# Patient Record
Sex: MALE | Race: WHITE | NOT HISPANIC OR LATINO
[De-identification: names, ages, dates, MRNs, and addresses within clinical notes are randomized per-mention and may not be internally consistent; named-entity substitution may affect disease eponyms.]

---

## 2017-02-14 ENCOUNTER — TRANSCRIPTION ENCOUNTER (OUTPATIENT)
Age: 62
End: 2017-02-14

## 2017-08-03 ENCOUNTER — OUTPATIENT (OUTPATIENT)
Dept: OUTPATIENT SERVICES | Facility: HOSPITAL | Age: 62
LOS: 1 days | Discharge: HOME | End: 2017-08-03

## 2017-08-07 DIAGNOSIS — K29.60 OTHER GASTRITIS WITHOUT BLEEDING: ICD-10-CM

## 2017-08-07 DIAGNOSIS — K44.9 DIAPHRAGMATIC HERNIA WITHOUT OBSTRUCTION OR GANGRENE: ICD-10-CM

## 2017-08-07 DIAGNOSIS — K22.70 BARRETT'S ESOPHAGUS WITHOUT DYSPLASIA: ICD-10-CM

## 2017-08-07 DIAGNOSIS — K31.7 POLYP OF STOMACH AND DUODENUM: ICD-10-CM

## 2017-08-07 DIAGNOSIS — I10 ESSENTIAL (PRIMARY) HYPERTENSION: ICD-10-CM

## 2017-08-08 ENCOUNTER — TRANSCRIPTION ENCOUNTER (OUTPATIENT)
Age: 62
End: 2017-08-08

## 2017-08-11 ENCOUNTER — INPATIENT (INPATIENT)
Facility: HOSPITAL | Age: 62
LOS: 0 days | Discharge: HOME | End: 2017-08-12
Attending: INTERNAL MEDICINE

## 2017-08-16 DIAGNOSIS — E04.1 NONTOXIC SINGLE THYROID NODULE: ICD-10-CM

## 2017-08-16 DIAGNOSIS — J36 PERITONSILLAR ABSCESS: ICD-10-CM

## 2017-08-16 DIAGNOSIS — E86.0 DEHYDRATION: ICD-10-CM

## 2017-08-16 DIAGNOSIS — R13.10 DYSPHAGIA, UNSPECIFIED: ICD-10-CM

## 2017-08-16 DIAGNOSIS — K22.70 BARRETT'S ESOPHAGUS WITHOUT DYSPLASIA: ICD-10-CM

## 2017-08-16 DIAGNOSIS — I10 ESSENTIAL (PRIMARY) HYPERTENSION: ICD-10-CM

## 2017-08-16 DIAGNOSIS — K21.9 GASTRO-ESOPHAGEAL REFLUX DISEASE WITHOUT ESOPHAGITIS: ICD-10-CM

## 2017-08-24 ENCOUNTER — TRANSCRIPTION ENCOUNTER (OUTPATIENT)
Age: 62
End: 2017-08-24

## 2017-09-22 ENCOUNTER — OUTPATIENT (OUTPATIENT)
Dept: OUTPATIENT SERVICES | Facility: HOSPITAL | Age: 62
LOS: 1 days | Discharge: HOME | End: 2017-09-22

## 2017-09-22 DIAGNOSIS — R10.13 EPIGASTRIC PAIN: ICD-10-CM

## 2017-12-28 ENCOUNTER — OUTPATIENT (OUTPATIENT)
Dept: OUTPATIENT SERVICES | Facility: HOSPITAL | Age: 62
LOS: 1 days | Discharge: HOME | End: 2017-12-28

## 2017-12-28 DIAGNOSIS — D44.0 NEOPLASM OF UNCERTAIN BEHAVIOR OF THYROID GLAND: ICD-10-CM

## 2018-05-31 ENCOUNTER — APPOINTMENT (OUTPATIENT)
Dept: SURGERY | Facility: CLINIC | Age: 63
End: 2018-05-31
Payer: COMMERCIAL

## 2018-05-31 VITALS
DIASTOLIC BLOOD PRESSURE: 82 MMHG | SYSTOLIC BLOOD PRESSURE: 138 MMHG | WEIGHT: 215 LBS | BODY MASS INDEX: 26.18 KG/M2 | HEIGHT: 76 IN

## 2018-05-31 DIAGNOSIS — Z78.9 OTHER SPECIFIED HEALTH STATUS: ICD-10-CM

## 2018-05-31 DIAGNOSIS — Z82.49 FAMILY HISTORY OF ISCHEMIC HEART DISEASE AND OTHER DISEASES OF THE CIRCULATORY SYSTEM: ICD-10-CM

## 2018-05-31 DIAGNOSIS — H54.7 UNSPECIFIED VISUAL LOSS: ICD-10-CM

## 2018-05-31 DIAGNOSIS — K60.2 ANAL FISSURE, UNSPECIFIED: ICD-10-CM

## 2018-05-31 DIAGNOSIS — K64.9 UNSPECIFIED HEMORRHOIDS: ICD-10-CM

## 2018-05-31 DIAGNOSIS — K30 FUNCTIONAL DYSPEPSIA: ICD-10-CM

## 2018-05-31 DIAGNOSIS — R12 HEARTBURN: ICD-10-CM

## 2018-05-31 DIAGNOSIS — Z80.0 FAMILY HISTORY OF MALIGNANT NEOPLASM OF DIGESTIVE ORGANS: ICD-10-CM

## 2018-05-31 DIAGNOSIS — Z83.49 FAMILY HISTORY OF OTHER ENDOCRINE, NUTRITIONAL AND METABOLIC DISEASES: ICD-10-CM

## 2018-05-31 DIAGNOSIS — Z82.3 FAMILY HISTORY OF STROKE: ICD-10-CM

## 2018-05-31 DIAGNOSIS — K59.00 CONSTIPATION, UNSPECIFIED: ICD-10-CM

## 2018-05-31 PROCEDURE — 46600 DIAGNOSTIC ANOSCOPY SPX: CPT

## 2018-05-31 PROCEDURE — 99203 OFFICE O/P NEW LOW 30 MIN: CPT | Mod: 25

## 2018-06-04 PROBLEM — Z83.49 FAMILY HISTORY OF THYROID DISEASE: Status: ACTIVE | Noted: 2018-05-31

## 2018-06-04 PROBLEM — Z82.3 FAMILY HISTORY OF CEREBROVASCULAR ACCIDENT (CVA): Status: ACTIVE | Noted: 2018-05-31

## 2018-06-04 PROBLEM — Z78.9 CAFFEINE USE: Status: ACTIVE | Noted: 2018-05-31

## 2018-06-04 PROBLEM — Z80.0 FAMILY HISTORY OF LIVER CANCER: Status: ACTIVE | Noted: 2018-05-31

## 2018-06-04 PROBLEM — Z82.49 FAMILY HISTORY OF CARDIAC DISORDER: Status: ACTIVE | Noted: 2018-05-31

## 2018-06-04 PROBLEM — Z78.9 DOES NOT USE TOBACCO: Status: ACTIVE | Noted: 2018-05-31

## 2018-06-04 PROBLEM — Z78.9 SOCIAL ALCOHOL USE: Status: ACTIVE | Noted: 2018-05-31

## 2018-07-03 ENCOUNTER — APPOINTMENT (OUTPATIENT)
Dept: SURGERY | Facility: CLINIC | Age: 63
End: 2018-07-03

## 2019-03-20 ENCOUNTER — OUTPATIENT (OUTPATIENT)
Dept: OUTPATIENT SERVICES | Facility: HOSPITAL | Age: 64
LOS: 1 days | Discharge: HOME | End: 2019-03-20

## 2019-03-20 DIAGNOSIS — A09 INFECTIOUS GASTROENTERITIS AND COLITIS, UNSPECIFIED: ICD-10-CM

## 2019-03-20 DIAGNOSIS — R94.5 ABNORMAL RESULTS OF LIVER FUNCTION STUDIES: ICD-10-CM

## 2019-03-29 ENCOUNTER — OUTPATIENT (OUTPATIENT)
Dept: OUTPATIENT SERVICES | Facility: HOSPITAL | Age: 64
LOS: 1 days | Discharge: HOME | End: 2019-03-29

## 2019-03-29 DIAGNOSIS — N39.0 URINARY TRACT INFECTION, SITE NOT SPECIFIED: ICD-10-CM

## 2019-07-15 ENCOUNTER — EMERGENCY (EMERGENCY)
Facility: HOSPITAL | Age: 64
LOS: 0 days | Discharge: HOME | End: 2019-07-15
Attending: EMERGENCY MEDICINE | Admitting: EMERGENCY MEDICINE
Payer: COMMERCIAL

## 2019-07-15 VITALS
HEART RATE: 87 BPM | DIASTOLIC BLOOD PRESSURE: 70 MMHG | OXYGEN SATURATION: 99 % | RESPIRATION RATE: 18 BRPM | SYSTOLIC BLOOD PRESSURE: 164 MMHG | TEMPERATURE: 98 F

## 2019-07-15 VITALS
RESPIRATION RATE: 17 BRPM | DIASTOLIC BLOOD PRESSURE: 71 MMHG | TEMPERATURE: 98 F | HEART RATE: 69 BPM | SYSTOLIC BLOOD PRESSURE: 119 MMHG | OXYGEN SATURATION: 98 %

## 2019-07-15 DIAGNOSIS — N17.9 ACUTE KIDNEY FAILURE, UNSPECIFIED: ICD-10-CM

## 2019-07-15 DIAGNOSIS — R00.1 BRADYCARDIA, UNSPECIFIED: ICD-10-CM

## 2019-07-15 DIAGNOSIS — R42 DIZZINESS AND GIDDINESS: ICD-10-CM

## 2019-07-15 DIAGNOSIS — R07.89 OTHER CHEST PAIN: ICD-10-CM

## 2019-07-15 DIAGNOSIS — F41.9 ANXIETY DISORDER, UNSPECIFIED: ICD-10-CM

## 2019-07-15 LAB
ALBUMIN SERPL ELPH-MCNC: 4.3 G/DL — SIGNIFICANT CHANGE UP (ref 3.5–5.2)
ALP SERPL-CCNC: 124 U/L — HIGH (ref 30–115)
ALT FLD-CCNC: 47 U/L — HIGH (ref 0–41)
ANION GAP SERPL CALC-SCNC: 15 MMOL/L — HIGH (ref 7–14)
APPEARANCE UR: CLEAR — SIGNIFICANT CHANGE UP
AST SERPL-CCNC: 26 U/L — SIGNIFICANT CHANGE UP (ref 0–41)
BASE EXCESS BLDV CALC-SCNC: -1.4 MMOL/L — SIGNIFICANT CHANGE UP (ref -2–2)
BILIRUB SERPL-MCNC: 0.4 MG/DL — SIGNIFICANT CHANGE UP (ref 0.2–1.2)
BILIRUB UR-MCNC: NEGATIVE — SIGNIFICANT CHANGE UP
BUN SERPL-MCNC: 43 MG/DL — HIGH (ref 10–20)
CA-I SERPL-SCNC: 1.25 MMOL/L — SIGNIFICANT CHANGE UP (ref 1.12–1.3)
CALCIUM SERPL-MCNC: 9.8 MG/DL — SIGNIFICANT CHANGE UP (ref 8.5–10.1)
CHLORIDE SERPL-SCNC: 103 MMOL/L — SIGNIFICANT CHANGE UP (ref 98–110)
CO2 SERPL-SCNC: 19 MMOL/L — SIGNIFICANT CHANGE UP (ref 17–32)
COLOR SPEC: YELLOW — SIGNIFICANT CHANGE UP
CREAT SERPL-MCNC: 2 MG/DL — HIGH (ref 0.7–1.5)
DIFF PNL FLD: NEGATIVE — SIGNIFICANT CHANGE UP
GAS PNL BLDV: 137 MMOL/L — SIGNIFICANT CHANGE UP (ref 136–145)
GAS PNL BLDV: SIGNIFICANT CHANGE UP
GLUCOSE SERPL-MCNC: 100 MG/DL — HIGH (ref 70–99)
GLUCOSE UR QL: NEGATIVE — SIGNIFICANT CHANGE UP
HCO3 BLDV-SCNC: 23 MMOL/L — SIGNIFICANT CHANGE UP (ref 22–29)
HCT VFR BLD CALC: 35.2 % — LOW (ref 42–52)
HCT VFR BLDA CALC: 43.6 % — SIGNIFICANT CHANGE UP (ref 34–44)
HGB BLD CALC-MCNC: 14.2 G/DL — SIGNIFICANT CHANGE UP (ref 14–18)
HGB BLD-MCNC: 11.7 G/DL — LOW (ref 14–18)
KETONES UR-MCNC: NEGATIVE — SIGNIFICANT CHANGE UP
LACTATE BLDV-MCNC: 2.1 MMOL/L — HIGH (ref 0.5–1.6)
LEUKOCYTE ESTERASE UR-ACNC: NEGATIVE — SIGNIFICANT CHANGE UP
MAGNESIUM SERPL-MCNC: 2.1 MG/DL — SIGNIFICANT CHANGE UP (ref 1.8–2.4)
MCHC RBC-ENTMCNC: 27.3 PG — SIGNIFICANT CHANGE UP (ref 27–31)
MCHC RBC-ENTMCNC: 33.2 G/DL — SIGNIFICANT CHANGE UP (ref 32–37)
MCV RBC AUTO: 82.2 FL — SIGNIFICANT CHANGE UP (ref 80–94)
NITRITE UR-MCNC: NEGATIVE — SIGNIFICANT CHANGE UP
NRBC # BLD: 0 /100 WBCS — SIGNIFICANT CHANGE UP (ref 0–0)
NT-PROBNP SERPL-SCNC: 721 PG/ML — HIGH (ref 0–300)
PCO2 BLDV: 36 MMHG — LOW (ref 41–51)
PH BLDV: 7.41 — SIGNIFICANT CHANGE UP (ref 7.26–7.43)
PH UR: 6.5 — SIGNIFICANT CHANGE UP (ref 5–8)
PLATELET # BLD AUTO: 229 K/UL — SIGNIFICANT CHANGE UP (ref 130–400)
PO2 BLDV: 27 MMHG — SIGNIFICANT CHANGE UP (ref 20–40)
POTASSIUM BLDV-SCNC: 4.5 MMOL/L — SIGNIFICANT CHANGE UP (ref 3.3–5.6)
POTASSIUM SERPL-MCNC: 5 MMOL/L — SIGNIFICANT CHANGE UP (ref 3.5–5)
POTASSIUM SERPL-SCNC: 5 MMOL/L — SIGNIFICANT CHANGE UP (ref 3.5–5)
PROT SERPL-MCNC: 7.3 G/DL — SIGNIFICANT CHANGE UP (ref 6–8)
PROT UR-MCNC: ABNORMAL
RBC # BLD: 4.28 M/UL — LOW (ref 4.7–6.1)
RBC # FLD: 14.5 % — SIGNIFICANT CHANGE UP (ref 11.5–14.5)
SAO2 % BLDV: 50 % — SIGNIFICANT CHANGE UP
SODIUM SERPL-SCNC: 137 MMOL/L — SIGNIFICANT CHANGE UP (ref 135–146)
SP GR SPEC: 1.01 — SIGNIFICANT CHANGE UP (ref 1.01–1.03)
TROPONIN T SERPL-MCNC: <0.01 NG/ML — SIGNIFICANT CHANGE UP
UROBILINOGEN FLD QL: 0.2 — SIGNIFICANT CHANGE UP (ref 0.2–0.2)
WBC # BLD: 6.44 K/UL — SIGNIFICANT CHANGE UP (ref 4.8–10.8)
WBC # FLD AUTO: 6.44 K/UL — SIGNIFICANT CHANGE UP (ref 4.8–10.8)
WBC UR QL: 1 /HPF — SIGNIFICANT CHANGE UP (ref 0–5)

## 2019-07-15 PROCEDURE — 93289 INTERROG DEVICE EVAL HEART: CPT | Mod: 26

## 2019-07-15 PROCEDURE — 93010 ELECTROCARDIOGRAM REPORT: CPT | Mod: 76

## 2019-07-15 PROCEDURE — 71046 X-RAY EXAM CHEST 2 VIEWS: CPT | Mod: 26

## 2019-07-15 PROCEDURE — 99285 EMERGENCY DEPT VISIT HI MDM: CPT

## 2019-07-15 RX ORDER — ALPRAZOLAM 0.25 MG
0.25 TABLET ORAL ONCE
Refills: 0 | Status: DISCONTINUED | OUTPATIENT
Start: 2019-07-15 | End: 2019-07-15

## 2019-07-15 RX ORDER — SODIUM CHLORIDE 9 MG/ML
500 INJECTION INTRAMUSCULAR; INTRAVENOUS; SUBCUTANEOUS ONCE
Refills: 0 | Status: COMPLETED | OUTPATIENT
Start: 2019-07-15 | End: 2019-07-15

## 2019-07-15 RX ORDER — SODIUM CHLORIDE 9 MG/ML
1000 INJECTION INTRAMUSCULAR; INTRAVENOUS; SUBCUTANEOUS ONCE
Refills: 0 | Status: COMPLETED | OUTPATIENT
Start: 2019-07-15 | End: 2019-07-15

## 2019-07-15 RX ADMIN — SODIUM CHLORIDE 500 MILLILITER(S): 9 INJECTION INTRAMUSCULAR; INTRAVENOUS; SUBCUTANEOUS at 10:50

## 2019-07-15 RX ADMIN — SODIUM CHLORIDE 500 MILLILITER(S): 9 INJECTION INTRAMUSCULAR; INTRAVENOUS; SUBCUTANEOUS at 11:40

## 2019-07-15 RX ADMIN — Medication 0.25 MILLIGRAM(S): at 10:49

## 2019-07-15 NOTE — ED PROVIDER NOTE - ATTENDING CONTRIBUTION TO CARE
ATTENDING NOTE: I personally evaluated the patient. I reviewed the Resident’s (as assigned above), and agree with the findings and plan except as documented in my note.   64 y/o M recent MI s/p PCI s/p stent with pacemaker placed, on Amiodarone, Metoprolol presents with c/o “head pressure” and near syncope. Pt states feeling SX while driving this morning, with SX resolving on arrival to ED. Otherwise has been in his usual state of sameer. Denies fever/chills, vision changes, CP, palpitations. No HX of syncope.  On exam: Pt is well appearing in NAD. Head NCAT. PERRL, EOMI. MMM. Lungs CTAB, S1S2 regular. Abdomen soft, NT/ND. AAO x 3, normal speech, no facial asymmetry, negative pronator drift, no ataxia, negative Romberg, no dysdiadokinesia, no nystagmus, peripheral vision intact, sensory equal and intact   Plan for pacemaker interrogation, bedside echo, EKG, cardiac enzymes, IVF and reassess. Disposition unclear at this time.

## 2019-07-15 NOTE — ED PROVIDER NOTE - PROGRESS NOTE DETAILS
EP aware, will interrogate Pt became fabián, awake alert, repeat EKG was done. ATTENDING NOTE: I personally evaluated the patient. I reviewed the Resident’s (as assigned above), and agree with the findings and plan except as documented in my note.   62 y/o M recent MI s/p PCI s/p stent with pacemaker placed, on Amiodarone, Metoprolol presents with c/o “head pressure” and near syncope. Pt states feeling SX while driving this morning, with SX resolving on arrival to ED. Otherwise has been in his usual state of sameer. Denies fever/chills, vision changes, CP, palpitations. No HX of syncope.  On exam: Pt is well appearing in NAD. Head NCAT. PERRL, EOMI. MMM. Lungs CTAB, S1S2 regular. Abdomen soft, NT/ND. AAO x 3, normal speech, no facial asymmetry, negative pronator drift, no ataxia, negative Romberg, no dysdiadokinesia, no nystagmus, peripheral vision intact, sensory equal and intact   Plan for pacemaker interrogation, bedside echo, EKG, cardiac enzymes, IVF and reassess. Disposition unclear at this time. EP interrogated device. NO events Per CHF team, ok to discharge home, no aldactone and half the dose of lisinopril to 2.5. Follow up outpatient Discussed with pt and family labs and imaging. Discussed need to follow up with CHF team. Discussed signs and symptoms to return to the ED for. Pt understands and agrees with discharge plan.

## 2019-07-15 NOTE — ED PROVIDER NOTE - CLINICAL SUMMARY MEDICAL DECISION MAKING FREE TEXT BOX
Pacemaker interrogated without abnormality. Lab work reassuring. Discussed with patients heart failure team. Medications adjusted for hyperkalemia. Pt voiced understanding and agrees with plan for discharge. Family aware. All questions answered. Copies of results provided.

## 2019-07-15 NOTE — ED ADULT NURSE NOTE - OBJECTIVE STATEMENT
Patient present to ED with c/o dizziness and SOB. Patient notes he had palpations and SOB when he first arrived but at present time he doesn't feel like that. Nose bleed noted earlier this morning. Denies any pain or discomfort at present time. Patient notes dizziness and light headed this morning to the point he thought he was going to passout

## 2019-07-15 NOTE — ED ADULT NURSE NOTE - NSIMPLEMENTINTERV_GEN_ALL_ED
Implemented All Fall with Harm Risk Interventions:  [place] to call system. Call bell, personal items and telephone within reach. Instruct patient to call for assistance. Room bathroom lighting operational. Non-slip footwear when patient is off stretcher. Physically safe environment: no spills, clutter or unnecessary equipment. Stretcher in lowest position, wheels locked, appropriate side rails in place. Provide visual cue, wrist band, yellow gown, etc. Monitor gait and stability. Monitor for mental status changes and reorient to person, place, and time. Review medications for side effects contributing to fall risk. Reinforce activity limits and safety measures with patient and family. Provide visual clues: red socks.

## 2019-07-15 NOTE — CONSULT NOTE ADULT - ASSESSMENT
62 YO M with pmh of CAD s/p STEMI s/p PCI to LAD in Feb 2019, complicated by cardiogenic shock, HFrEF s/p AICD, HTN, and GERD p/w symptoms of dizziness, vertigo, and feeling of fullness and warmth in the head being seen by Cardiology for pre-syncopal symptoms.    A & P:    HTN  CAD s/p PCI to LAD  HFrEF  Dizziness/vertigo    - symptoms likely secondary to orthostatic hypotension, positive orthostatics in the ER  - IV Fluids to replete volume  - c/w GDMT for CAD and HFrEF DAPT, statin, betablocker, ACEi, spironolactone  - no events on tele monitor  - interrogate the AICD  - can f/u with Cardiology and EP as outpatient

## 2019-07-15 NOTE — ED ADULT NURSE NOTE - ED STAT RN HANDOFF DETAILS
Patient discharged home as per MD order, IV access removed no redness, swelling or bleeding noted at site. All discharge instructions and F/U visits provided to patient patient verbalizes understanding leaving ambulatory in no acute distress.

## 2019-07-15 NOTE — ED PROVIDER NOTE - NSFOLLOWUPINSTRUCTIONS_ED_ALL_ED_FT
Acute Kidney Injury    WHAT YOU NEED TO KNOW:    What is acute kidney injury? Acute kidney injury (JONNY) is also called acute kidney failure, or acute renal failure. JONNY happens when your kidneys suddenly stop working correctly. Normally, the kidneys remove fluid, chemicals, and waste from your blood. These wastes are turned into urine by your kidneys. JONNY usually happens over hours or days. When you have JONNY, your kidneys do not remove the waste, chemicals, or extra fluid from your body. A normal amount of urine is not produced. JONNY is usually temporary, but it may become a chronic kidney condition.     What causes JONNY?     Decreased blood flow to the kidney, such as from hypercalcemia (high blood calcium level) or severe heart disease       A disease or condition that affects the kidneys, such as hypertension (high blood pressure) or diabetes       A blockage in the kidney or ureter, such as a kidney or bladder stone, enlarged prostate, or tumor     What increases my risk for JONNY?     Being hospitalized with a serious illness, such as sepsis or severe burns      Peripheral artery disease      Older age in adults      Kidney or liver diseases      Medical conditions such as dehydration, hypertension, diabetes, or heart failure      Certain medicines such as NSAIDs    What are the signs and symptoms of JONNY? You may not have any symptoms with early or mild JONNY. As JONNY progresses, you may have any of the following:     Decrease in the amount of urine or no urination      Swelling in your arms, legs, or feet       Weakness, drowsiness, or no appetite      Nausea, flank pain, muscle twitching or muscle cramps      Itchy skin, or your breath or body smells like urine      Behavior changes, confusion, disorientation, or seizures    How is JONNY diagnosed? There are many causes of JONNY. To find the cause and to treat your JONNY correctly, your healthcare provider may do any of the following:     Blood and urine tests show how well your kidneys are working. They may also show the cause of your JONNY.       An x-ray or ultrasound may show problems with your kidneys. Your healthcare provider may see a blockage in your kidneys. He or she may see narrowing of the artery that sends blood to your kidneys. You may be given contrast liquid to help your kidneys show up better in the pictures. Tell the healthcare provider if you have ever had an allergic reaction to contrast liquid.     How is JONNY treated? Treatment depends upon the cause of your acute kidney injury and how severe it is. Usually, JONNY will be monitored in the hospital. If you have mild JONNY, you may be able to go home to recover. Your healthcare providers will treat the cause of your JONNY. You may need IV fluids if your JONNY was caused by little or no fluid in your body. You may need dialysis to remove waste and extra fluid from your body. Your healthcare provider may tell you to eat food low in sodium (salt), potassium, phosphorus, or protein. You may need to see a dietitian before you are discharged to get help with planning your meals.     How can I prevent JONNY?     Manage other health conditions such as diabetes, high blood pressure, or heart disease. These conditions increase your risk for acute kidney injury. Take your medicines for these conditions as directed. Also, monitor your blood sugar and blood pressure levels as directed. Contact your healthcare provider if your levels are not in the range he or she says it should be.      Talk to your healthcare provider before you take over-the-counter-medicine. NSAIDs, stomach medicine, or laxatives may harm your kidneys and increase your risk for acute kidney injury. If it is okay to take the medicine, follow the directions on the package. Do not take more than directed.       Tell healthcare providers if you have had JONNY before you get contrast liquid for an x-ray or CT scan. Your healthcare provider may give you medicine to prevent kidney problems caused by the liquid.     CARE AGREEMENT:    You have the right to help plan your care. Learn about your health condition and how it may be treated. Discuss treatment options with your healthcare providers to decide what care you want to receive. You always have the right to refuse treatment.     Dizziness    Dizziness can manifest as a feeling of unsteadiness or light-headedness. You may feel like you are about to faint. This condition can be caused by a number of things, including medicines, dehydration, or illness. Drink enough fluid to keep your urine clear or pale yellow. Do not drink alcohol and limit your caffeine intake. Avoid quick or sudden movements.  Rise slowly from chairs and steady yourself until you feel okay. In the morning, first sit up on the side of the bed.    SEEK IMMEDIATE MEDICAL CARE IF YOU HAVE ANY OF THE FOLLOWING SYMPTOMS: vomiting, changes in your vision or speech, weakness in your arms or legs, trouble speaking or swallowing, chest pain, abdominal pain, shortness of breath, sweating, bleeding, headache, neck pain, or fever. PLEASE STOP TAKING ALDACTONE AND PLEASE TAKE HALF THE DOSE OF LISINOPRIL WHICH WILL BE 2.5     PLEASE FOLLOW UP WITH THE CARDIOLOGIST     Acute Kidney Injury    WHAT YOU NEED TO KNOW:    What is acute kidney injury? Acute kidney injury (JONNY) is also called acute kidney failure, or acute renal failure. JONNY happens when your kidneys suddenly stop working correctly. Normally, the kidneys remove fluid, chemicals, and waste from your blood. These wastes are turned into urine by your kidneys. JONNY usually happens over hours or days. When you have JONNY, your kidneys do not remove the waste, chemicals, or extra fluid from your body. A normal amount of urine is not produced. JONNY is usually temporary, but it may become a chronic kidney condition.     What causes JONNY?     Decreased blood flow to the kidney, such as from hypercalcemia (high blood calcium level) or severe heart disease       A disease or condition that affects the kidneys, such as hypertension (high blood pressure) or diabetes       A blockage in the kidney or ureter, such as a kidney or bladder stone, enlarged prostate, or tumor     What increases my risk for JONNY?     Being hospitalized with a serious illness, such as sepsis or severe burns      Peripheral artery disease      Older age in adults      Kidney or liver diseases      Medical conditions such as dehydration, hypertension, diabetes, or heart failure      Certain medicines such as NSAIDs    What are the signs and symptoms of JONNY? You may not have any symptoms with early or mild JONNY. As JONNY progresses, you may have any of the following:     Decrease in the amount of urine or no urination      Swelling in your arms, legs, or feet       Weakness, drowsiness, or no appetite      Nausea, flank pain, muscle twitching or muscle cramps      Itchy skin, or your breath or body smells like urine      Behavior changes, confusion, disorientation, or seizures    How is JONNY diagnosed? There are many causes of JONNY. To find the cause and to treat your JONNY correctly, your healthcare provider may do any of the following:     Blood and urine tests show how well your kidneys are working. They may also show the cause of your JONNY.       An x-ray or ultrasound may show problems with your kidneys. Your healthcare provider may see a blockage in your kidneys. He or she may see narrowing of the artery that sends blood to your kidneys. You may be given contrast liquid to help your kidneys show up better in the pictures. Tell the healthcare provider if you have ever had an allergic reaction to contrast liquid.     How is JONNY treated? Treatment depends upon the cause of your acute kidney injury and how severe it is. Usually, JONNY will be monitored in the hospital. If you have mild JONNY, you may be able to go home to recover. Your healthcare providers will treat the cause of your JONNY. You may need IV fluids if your JONNY was caused by little or no fluid in your body. You may need dialysis to remove waste and extra fluid from your body. Your healthcare provider may tell you to eat food low in sodium (salt), potassium, phosphorus, or protein. You may need to see a dietitian before you are discharged to get help with planning your meals.     How can I prevent JONNY?     Manage other health conditions such as diabetes, high blood pressure, or heart disease. These conditions increase your risk for acute kidney injury. Take your medicines for these conditions as directed. Also, monitor your blood sugar and blood pressure levels as directed. Contact your healthcare provider if your levels are not in the range he or she says it should be.      Talk to your healthcare provider before you take over-the-counter-medicine. NSAIDs, stomach medicine, or laxatives may harm your kidneys and increase your risk for acute kidney injury. If it is okay to take the medicine, follow the directions on the package. Do not take more than directed.       Tell healthcare providers if you have had JONNY before you get contrast liquid for an x-ray or CT scan. Your healthcare provider may give you medicine to prevent kidney problems caused by the liquid.     CARE AGREEMENT:    You have the right to help plan your care. Learn about your health condition and how it may be treated. Discuss treatment options with your healthcare providers to decide what care you want to receive. You always have the right to refuse treatment.     Dizziness    Dizziness can manifest as a feeling of unsteadiness or light-headedness. You may feel like you are about to faint. This condition can be caused by a number of things, including medicines, dehydration, or illness. Drink enough fluid to keep your urine clear or pale yellow. Do not drink alcohol and limit your caffeine intake. Avoid quick or sudden movements.  Rise slowly from chairs and steady yourself until you feel okay. In the morning, first sit up on the side of the bed.    SEEK IMMEDIATE MEDICAL CARE IF YOU HAVE ANY OF THE FOLLOWING SYMPTOMS: vomiting, changes in your vision or speech, weakness in your arms or legs, trouble speaking or swallowing, chest pain, abdominal pain, shortness of breath, sweating, bleeding, headache, neck pain, or fever.

## 2019-07-15 NOTE — ED PROVIDER NOTE - CARE PLAN
Principal Discharge DX:	JONNY (acute kidney injury)  Secondary Diagnosis:	Dizziness  Secondary Diagnosis:	Anxiety

## 2019-07-15 NOTE — ED PROVIDER NOTE - OBJECTIVE STATEMENT
62 yo M with hx of MI complete LAD occlusion in Feb 2019 on IMPALA trach at that time, s/p 2 stents on brillinta, HTN, AICD  [employer], presents for evaluation of "feeling dizzy", which he described as lightheaded, onset PTA, associated with SOB. NO fever, no chest pain, no back pain, no abdominal pain, no palpitations, no headache, no n/v/d. Pt states that he woke up ok this morning, was on his way to the [facility] to see his EP when he started to have the symptoms and decided to come to the ED. Pt denies any other symptoms.

## 2019-07-15 NOTE — CONSULT NOTE ADULT - SUBJECTIVE AND OBJECTIVE BOX
HPI:  60 YO M with pmh of CAD s/p STEMI s/p PCI to LAD in Feb 2019, complicated by cardiogenic shock, HFrEF s/p AICD, HTN, and GERD p/w symptoms of dizziness, vertigo, and feeling of fullness and warmth in the head. Pt. reports that he was driving the car when he noticed these symptoms and he pulled over. Pt. denies any chest pain/pressure, sob, or palpitations, orthopnea, pnd.  Reports compliance with medications.    PAST MEDICAL & SURGICAL HISTORY  CAD s/p STEMI s/p PCI to LAD 2/2019  HTN  GERD  HFrEF    FAMILY HISTORY:  FAMILY HISTORY:  Father MI in 30's  Borther MI in 40's    SOCIAL HISTORY:  []smoker  []Alcohol  []Drug    ALLERGIES:  No Known Allergies      MEDICATIONS:  MEDICATIONS  (STANDING):    MEDICATIONS  (PRN):      HOME MEDICATIONS:  Home Medications:  ASA 81mg Q24  Brilinta 90mg Q12  Lipitor   Toprol XL 50mg  Spironolactone  Lisinopril 5mg  Finasteride  Amiodarone 200mg Q24    VITALS:   T(F): 98 (07-15 @ 14:12), Max: 98 (07-15 @ 09:02)  HR: 69 (07-15 @ 14:12) (69 - 87)  BP: 119/71 (07-15 @ 14:12) (119/71 - 164/70)  BP(mean): --  RR: 17 (07-15 @ 14:12) (17 - 18)  SpO2: 98% (07-15 @ 14:12) (98% - 99%)    I&O's Summary      REVIEW OF SYSTEMS:  CONSTITUTIONAL: No weakness, fevers or chills  EYES/ENT: No visual changes;  No vertigo or throat pain   NECK: No pain or stiffness  RESPIRATORY: No cough, wheezing, hemoptysis; No shortness of breath  CARDIOVASCULAR: No chest pain or palpitations  GASTROINTESTINAL: No abdominal or epigastric pain. No nausea, vomiting, or hematemesis; No diarrhea or constipation. No melena or hematochezia.  GENITOURINARY: No dysuria, frequency or hematuria  NEUROLOGICAL: No numbness or weakness, c/o dizziness  SKIN: No itching, no rashes    PHYSICAL EXAM:  NEURO: patient is awake , alert and oriented  GEN: Not in acute distress  NECK: no thyroid enlargement, no JVD  LUNGS: Clear to auscultation bilaterally   CARDIOVASCULAR: S1/S2 present, RRR , no murmurs or rubs, no carotid bruits,  + PP bilaterally  ABD: Soft, non-tender, non-distended, +BS  EXT: No ABDULLAHI  SKIN: Intact    LABS:                        11.7   6.44  )-----------( 229      ( 15 Jul 2019 09:27 )             35.2     07-15    137  |  103  |  43<H>  ----------------------------<  100<H>  5.0   |  19  |  2.0<H>    Ca    9.8      15 Jul 2019 09:27  Mg     2.1     07-15    TPro  7.3  /  Alb  4.3  /  TBili  0.4  /  DBili  x   /  AST  26  /  ALT  47<H>  /  AlkPhos  124<H>  07-15      Troponin T, Serum: <0.01 ng/mL (07-15-19 @ 09:27)    CARDIAC MARKERS ( 15 Jul 2019 09:27 )  x     / <0.01 ng/mL / x     / x     / x            Troponin trend:    Serum Pro-Brain Natriuretic Peptide: 721 pg/mL (07-15-19 @ 09:27)          RADIOLOGY:  -CXR: 7/15  No radiographic evidence of acute cardiopulmonary disease    -CATHETERIZATION: pt. reported cardiac cath in Feb 2019 with 2 stents in the LAD    ECG:  Sinus bradycardia @ 59 bpm, LAFB, anterolateral Q waves/inferior Q waves (c/w prior infarct)  TELEMETRY EVENTS:  No events

## 2019-07-16 LAB
CULTURE RESULTS: NO GROWTH — SIGNIFICANT CHANGE UP
SPECIMEN SOURCE: SIGNIFICANT CHANGE UP

## 2019-07-20 LAB
CULTURE RESULTS: SIGNIFICANT CHANGE UP
CULTURE RESULTS: SIGNIFICANT CHANGE UP
SPECIMEN SOURCE: SIGNIFICANT CHANGE UP
SPECIMEN SOURCE: SIGNIFICANT CHANGE UP

## 2019-12-28 ENCOUNTER — EMERGENCY (EMERGENCY)
Facility: HOSPITAL | Age: 64
LOS: 0 days | Discharge: HOME | End: 2019-12-28
Attending: EMERGENCY MEDICINE | Admitting: EMERGENCY MEDICINE
Payer: COMMERCIAL

## 2019-12-28 VITALS
TEMPERATURE: 96 F | DIASTOLIC BLOOD PRESSURE: 91 MMHG | RESPIRATION RATE: 18 BRPM | HEART RATE: 69 BPM | OXYGEN SATURATION: 98 % | SYSTOLIC BLOOD PRESSURE: 167 MMHG | WEIGHT: 199.96 LBS
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
## 2019-12-28 DIAGNOSIS — Z23 ENCOUNTER FOR IMMUNIZATION: ICD-10-CM

## 2019-12-28 DIAGNOSIS — M25.512 PAIN IN LEFT SHOULDER: ICD-10-CM

## 2019-12-28 DIAGNOSIS — S42.032A DISPLACED FRACTURE OF LATERAL END OF LEFT CLAVICLE, INITIAL ENCOUNTER FOR CLOSED FRACTURE: ICD-10-CM

## 2019-12-28 PROCEDURE — 73000 X-RAY EXAM OF COLLAR BONE: CPT | Mod: 26,LT

## 2019-12-28 PROCEDURE — 99284 EMERGENCY DEPT VISIT MOD MDM: CPT

## 2019-12-28 PROCEDURE — 73030 X-RAY EXAM OF SHOULDER: CPT | Mod: 26,LT

## 2019-12-28 RX ORDER — TETANUS TOXOID, REDUCED DIPHTHERIA TOXOID AND ACELLULAR PERTUSSIS VACCINE, ADSORBED 5; 2.5; 8; 8; 2.5 [IU]/.5ML; [IU]/.5ML; UG/.5ML; UG/.5ML; UG/.5ML
0.5 SUSPENSION INTRAMUSCULAR ONCE
Refills: 0 | Status: COMPLETED | OUTPATIENT
Start: 2019-12-28 | End: 2019-12-28

## 2019-12-28 RX ADMIN — TETANUS TOXOID, REDUCED DIPHTHERIA TOXOID AND ACELLULAR PERTUSSIS VACCINE, ADSORBED 0.5 MILLILITER(S): 5; 2.5; 8; 8; 2.5 SUSPENSION INTRAMUSCULAR at 21:29

## 2019-12-28 NOTE — ED ADULT NURSE NOTE - INTERVENTIONS DEFINITIONS
[place] to call system/Physically safe environment: no spills, clutter or unnecessary equipment/Stretcher in lowest position, wheels locked, appropriate side rails in place/Monitor gait and stability/Call bell, personal items and telephone within reach/Room bathroom lighting operational

## 2019-12-28 NOTE — ED PROVIDER NOTE - OBJECTIVE STATEMENT
Pt here for L shoulder pain after a fall 2h ago onto the shoulder.  On Brilinta.  No CHI/LOC.  Also sustained small lacerations to L hand.

## 2019-12-28 NOTE — ED PROVIDER NOTE - CARE PLAN
Principal Discharge DX:	Closed displaced fracture of acromial end of left clavicle, initial encounter

## 2019-12-28 NOTE — ED PROVIDER NOTE - CARE PROVIDER_API CALL
Miky Castillo)  Orthopaedic Surgery  [address] [place] [address]  Phone: [phone]  Fax: [phone]  Follow Up Time: 4-6 Days

## 2019-12-28 NOTE — ED PROVIDER NOTE - PHYSICAL EXAMINATION
Vital Signs: I have reviewed the initial vital signs.  Constitutional: well-nourished, appears stated age, no acute distress  Cardiovascular: regular rate, regular rhythm, well-perfused extremities, 2+ radial pulses  Respiratory: unlabored respiratory effort, clear to auscultation bilaterally  MSK: L shoulder soreness, good ROM  Psychiatric: appropriate mood, appropriate affect

## 2019-12-28 NOTE — ED PROVIDER NOTE - PATIENT PORTAL LINK FT
You can access the FollowMyHealth Patient Portal offered by [facility] by registering at the following website: [email or web address]. By joining [facility]’s FollowMyHealth portal, you will also be able to view your health information using other applications (apps) compatible with our system.

## 2019-12-28 NOTE — ED ADULT NURSE NOTE - OBJECTIVE STATEMENT
Pt c/o of L shoulder pain after a fall 2h ago onto the shoulder. pt On Brilinta. pt denies head trauma, LOC, cp, sob, abdominal pain, dizziness, headache.

## 2020-03-05 PROBLEM — I21.9 ACUTE MYOCARDIAL INFARCTION, UNSPECIFIED: Chronic | Status: ACTIVE | Noted: 2019-12-28

## 2020-06-16 ENCOUNTER — EMERGENCY (EMERGENCY)
Facility: HOSPITAL | Age: 65
LOS: 0 days | Discharge: HOME | End: 2020-06-16
Attending: EMERGENCY MEDICINE | Admitting: EMERGENCY MEDICINE
Payer: COMMERCIAL

## 2020-06-16 VITALS
WEIGHT: 210.1 LBS | RESPIRATION RATE: 18 BRPM | OXYGEN SATURATION: 100 % | DIASTOLIC BLOOD PRESSURE: 70 MMHG | SYSTOLIC BLOOD PRESSURE: 138 MMHG | HEART RATE: 62 BPM | TEMPERATURE: 98 F

## 2020-06-16 DIAGNOSIS — Z98.890 OTHER SPECIFIED POSTPROCEDURAL STATES: Chronic | ICD-10-CM

## 2020-06-16 DIAGNOSIS — M54.9 DORSALGIA, UNSPECIFIED: ICD-10-CM

## 2020-06-16 DIAGNOSIS — M54.5 LOW BACK PAIN: ICD-10-CM

## 2020-06-16 DIAGNOSIS — Z95.5 PRESENCE OF CORONARY ANGIOPLASTY IMPLANT AND GRAFT: Chronic | ICD-10-CM

## 2020-06-16 DIAGNOSIS — Z79.899 OTHER LONG TERM (CURRENT) DRUG THERAPY: ICD-10-CM

## 2020-06-16 DIAGNOSIS — Z95.810 PRESENCE OF AUTOMATIC (IMPLANTABLE) CARDIAC DEFIBRILLATOR: ICD-10-CM

## 2020-06-16 DIAGNOSIS — Z95.5 PRESENCE OF CORONARY ANGIOPLASTY IMPLANT AND GRAFT: ICD-10-CM

## 2020-06-16 DIAGNOSIS — Z95.810 PRESENCE OF AUTOMATIC (IMPLANTABLE) CARDIAC DEFIBRILLATOR: Chronic | ICD-10-CM

## 2020-06-16 PROCEDURE — 99284 EMERGENCY DEPT VISIT MOD MDM: CPT

## 2020-06-16 RX ORDER — METHOCARBAMOL 500 MG/1
1500 TABLET, FILM COATED ORAL ONCE
Refills: 0 | Status: COMPLETED | OUTPATIENT
Start: 2020-06-16 | End: 2020-06-16

## 2020-06-16 RX ORDER — METHOCARBAMOL 500 MG/1
2 TABLET, FILM COATED ORAL
Qty: 30 | Refills: 0
Start: 2020-06-16 | End: 2020-06-20

## 2020-06-16 RX ORDER — DEXAMETHASONE 0.5 MG/5ML
10 ELIXIR ORAL ONCE
Refills: 0 | Status: COMPLETED | OUTPATIENT
Start: 2020-06-16 | End: 2020-06-16

## 2020-06-16 RX ADMIN — Medication 10 MILLIGRAM(S): at 10:33

## 2020-06-16 RX ADMIN — METHOCARBAMOL 1500 MILLIGRAM(S): 500 TABLET, FILM COATED ORAL at 10:34

## 2020-06-16 NOTE — ED PROVIDER NOTE - CLINICAL SUMMARY MEDICAL DECISION MAKING FREE TEXT BOX
No signs of cord compression.  Rx Medrol dosepak.  Patient to f/u with Pain Management for injections.  Strict return instructions discussed.

## 2020-06-16 NOTE — ED ADULT NURSE NOTE - OBJECTIVE STATEMENT
Pt presents to ED with c/o right sided back pain radiating down the right leg for 2 days. Pt denies fall/trama. Pt ambulatory with antalgic gait. Pt states he took Tylenol OTC without relief of symptoms. Pt denies fever, cough, SOB, chest pain.

## 2020-06-16 NOTE — ED PROVIDER NOTE - PHYSICAL EXAMINATION
CONST: Well appearing in NAD  EYES: PERRL, EOMI, Sclera and conjunctiva clear.   NECK: Non-tender  CARD: Normal S1 S2; Normal rate and rhythm  RESP: Equal BS B/L, No wheezes, rhonchi or rales. No distress  GI: Soft, non-tender, non-distended.  MS: Normal ROM in all extremities. No midline spinal tenderness. Pain elicited to R lower back upon ROM and RLE leg lift.   SKIN: Warm, dry, no acute rashes. Good turgor  NEURO: A&Ox3, No focal deficits. Strength 5/5 with no sensory deficits to RLE. Sensory deficit to L knee, diminished to L foot (baseline as per pt) Antalgic gait

## 2020-06-16 NOTE — ED PROVIDER NOTE - CARE PROVIDER_API CALL
Anand Page  ANESTHESIOLOGY  [address] [place] [address]  Phone: [phone]  Fax: [phone]  Follow Up Time:

## 2020-06-16 NOTE — ED PROVIDER NOTE - PSH
AICD (automatic cardioverter/defibrillator) present    H/O hernia repair    Stented coronary artery  x2

## 2020-06-16 NOTE — ED ADULT NURSE NOTE - NSIMPLEMENTINTERV_GEN_ALL_ED
Implemented All Fall Risk Interventions:  [place] to call system. Call bell, personal items and telephone within reach. Instruct patient to call for assistance. Room bathroom lighting operational. Non-slip footwear when patient is off stretcher. Physically safe environment: no spills, clutter or unnecessary equipment. Stretcher in lowest position, wheels locked, appropriate side rails in place. Provide visual cue, wrist band, yellow gown, etc. Monitor gait and stability. Monitor for mental status changes and reorient to person, place, and time. Review medications for side effects contributing to fall risk. Reinforce activity limits and safety measures with patient and family.

## 2020-06-16 NOTE — ED PROVIDER NOTE - NSFOLLOWUPINSTRUCTIONS_ED_ALL_ED_FT
Lumbosacral Strain  Lumbosacral strain is an injury that causes pain in the lower back (lumbosacral spine). This injury usually occurs from overstretching the muscles or ligaments along your spine. A strain can affect one or more muscles or cord-like tissues that connect bones to other bones (ligaments).  What are the causes?  This condition may be caused by:  A hard, direct hit (blow) to the back.Excessive stretching of the lower back muscles. This may result from:  A fall.Lifting something heavy.Repetitive movements such as bending or crouching.What increases the risk?  The following factors may increase your risk of getting this condition:  Participating in sports or activities that involve:  A sudden twist of the back.Pushing or pulling motions.Being overweight or obese.Having poor strength and flexibility, especially tight hamstrings or weak muscles in the back or abdomen.Having too much of a curve in the lower back.Having a pelvis that is tilted forward.What are the signs or symptoms?  The main symptom of this condition is pain in the lower back, at the site of the strain. Pain may extend (radiate) down one or both legs.  How is this diagnosed?  This condition is diagnosed based on:  Your symptoms.Your medical history.A physical exam.  Your health care provider may push on certain areas of your back to determine the source of your pain.You may be asked to bend forward, backward, and side to side to assess the severity of your pain and your range of motion.Imaging tests, such as:  X-rays.MRI.How is this treated?  Treatment for this condition may include:  Putting heat and cold on the affected area.Medicines to help relieve pain and relax your muscles (muscle relaxants).NSAIDs to help reduce swelling and discomfort.When your symptoms improve, it is important to gradually return to your normal routine as soon as possible to reduce pain, avoid stiffness, and avoid loss of muscle strength. Generally, symptoms should improve within 6 weeks of treatment. However, recovery time varies.  Follow these instructions at home:  Managing pain, stiffness, and swelling        If directed, put ice on the injured area during the first 24 hours after your strain.  Put ice in a plastic bag.Place a towel between your skin and the bag.Leave the ice on for 20 minutes, 2–3 times a day.If directed, put heat on the affected area as often as told by your health care provider. Use the heat source that your health care provider recommends, such as a moist heat pack or a heating pad.  Place a towel between your skin and the heat source.Leave the heat on for 20–30 minutes.Remove the heat if your skin turns bright red. This is especially important if you are unable to feel pain, heat, or cold. You may have a greater risk of getting burned.Activity     Rest and return to your normal activities as told by your health care provider. Ask your health care provider what activities are safe for you.Avoid activities that take a lot of energy for as long as told by your health care provider.General instructions     Take over-the-counter and prescription medicines only as told by your health care provider.Do not drive or use heavy machinery while taking prescription pain medicine.Do not use any products that contain nicotine or tobacco, such as cigarettes and e-cigarettes. If you need help quitting, ask your health care provider.Keep all follow-up visits as told by your health care provider. This is important.How is this prevented?  Use correct form when playing sports and lifting heavy objects.Use good posture when sitting and standing.Maintain a healthy weight.Sleep on a mattress with medium firmness to support your back.Be safe and responsible while being active to avoid falls.Do at least 150 minutes of moderate-intensity exercise each week, such as brisk walking or water aerobics. Try a form of exercise that takes stress off your back, such as swimming or stationary cycling.Maintain physical fitness, including:  Strength.Flexibility.Cardiovascular fitness.Endurance.Contact a health care provider if:  Your back pain does not improve after 6 weeks of treatment.Your symptoms get worse.Get help right away if:  Your back pain is severe.You cannot stand or walk.You have difficulty controlling when you urinate or when you have a bowel movement.You feel nauseous or you vomit.Your feet get very cold.You have numbness, tingling, weakness, or problems using your arms or legs.You develop any of the following:  Shortness of breath.Dizziness.Pain in your legs.Weakness in your buttocks or legs.Discoloration of the skin on your toes or legs.This information is not intended to replace advice given to you by your health care provider. Make sure you discuss any questions you have with your health care provider.

## 2020-06-16 NOTE — ED PROVIDER NOTE - PATIENT PORTAL LINK FT
You can access the FollowMyHealth Patient Portal offered by [facility] by registering at the following website: [email or web address]. By joining [employer]’s FollowMyHealth portal, you will also be able to view your health information using other applications (apps) compatible with our system.

## 2020-06-16 NOTE — ED PROVIDER NOTE - NSFOLLOWUPCLINICS_GEN_ALL_ED_FT
[facility] ([facility])  Rehabilitation  [facility] [address] [place] [address]  Phone: [phone]  Fax:   Follow Up Time:

## 2020-06-16 NOTE — ED PROVIDER NOTE - NS ED ROS FT
CONST: No fever, chills or bodyaches  EYES: No pain, redness, drainage or visual changes.  ENT: No ear pain or discharge, nasal discharge or congestion. No sore throat  CARD: No chest pain, palpitations  RESP: No SOB, cough  GI: No abdominal pain, N/V/D  MS: see HPI  SKIN: No rashes  NEURO: No headache, dizziness, paresthesias or LOC

## 2020-06-16 NOTE — ED PROVIDER NOTE - OBJECTIVE STATEMENT
63yo male with PMHx AICD s/p MI with 2 stents 1 yr prior, L femoral nerve injury during hospital stay for MI last year, intermittent back pain over the years, presents c/o persistent, R lower back pain with some radiation into R buttock x 3 days, s/p "bending" to  shoes. Pt has been taking tylenol with no relief. pain exacerbated by movements and walking. Pt notes cannot straighten back up to walk. Denies paresthesia to RLE. Denies urine/bowel retention or incontinence.
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
## 2020-09-10 NOTE — ED ADULT NURSE NOTE - NSSUHOSCREENINGYN_ED_ALL_ED
Pt identified as potential readmission. Last admission 9/2 - 9/3 for rhabdomyolysis and JORGE. Pt here today for flank pain and body cramps. 200  LSW spoke to this pt and explained CM role. Pt reports he is from [place] and recently came to [facility] for a girl, which was a bad decision. Pt indicates he is not w/her any longer. However, pt notes he did land a good job (w/[employer]) as a result and his plan is to return to [place]. Pt states he should not have left hospital on 9/3 but had to as he needed to give his friend's car back and get his working. Pt explained his car is now working but believes his JORGE is still not resolved. Pt believes he is going to need readmission and already informed his employer he is going to need a medical leave. Pt c/o pain while LSW in room and appears so as he squinted his eyes a few times and was somewhat fidgety on cot as he cannot. Pt states he currently lives alone in a 1 story home. Pt does have insurance but it's [place] of state. He notes he can afford his medications. Pt does have a PCP from [place] and because he hopes to only be in [facility] for short period, he did not want PCP list which includes [facility]. LSW did complete ACP w/pt. LSW spoke to Dr. Irish Quintanilla about pt. She noted Labs repeated are reassuring and pt has small ketones in the urine. CT scan repeated which shows no acute intra-abdominal process. Nonobstructing stones in the kidneys, sacroiliitis. Pt can be d/c'd and he was wanting her to write script for him to be off work. She is not doing so and notes pt needs to follow-up w/PCP for any time off. LSW explained already offered him PCP list and he declined. Still, Dr. Irish Quintanilla requested lit to give to RN to give to pt @ d/c.     Readmission was avoided and pt was able to be d/c'd home. Yes - the patient is able to be screened

## 2020-09-12 ENCOUNTER — OUTPATIENT (OUTPATIENT)
Dept: OUTPATIENT SERVICES | Facility: HOSPITAL | Age: 65
LOS: 1 days | Discharge: HOME | End: 2020-09-12
Payer: COMMERCIAL

## 2020-09-12 DIAGNOSIS — Z98.890 OTHER SPECIFIED POSTPROCEDURAL STATES: Chronic | ICD-10-CM

## 2020-09-12 DIAGNOSIS — Z95.5 PRESENCE OF CORONARY ANGIOPLASTY IMPLANT AND GRAFT: Chronic | ICD-10-CM

## 2020-09-12 DIAGNOSIS — H91.92 UNSPECIFIED HEARING LOSS, LEFT EAR: ICD-10-CM

## 2020-09-12 DIAGNOSIS — Z95.810 PRESENCE OF AUTOMATIC (IMPLANTABLE) CARDIAC DEFIBRILLATOR: Chronic | ICD-10-CM

## 2020-09-12 PROCEDURE — 70480 CT ORBIT/EAR/FOSSA W/O DYE: CPT | Mod: 26

## 2020-09-26 ENCOUNTER — INPATIENT (INPATIENT)
Facility: HOSPITAL | Age: 65
LOS: 1 days | Discharge: HOME | End: 2020-09-28
Attending: HOSPITALIST | Admitting: HOSPITALIST
Payer: COMMERCIAL

## 2020-09-26 VITALS
SYSTOLIC BLOOD PRESSURE: 105 MMHG | TEMPERATURE: 98 F | DIASTOLIC BLOOD PRESSURE: 65 MMHG | OXYGEN SATURATION: 97 % | RESPIRATION RATE: 18 BRPM | HEART RATE: 61 BPM

## 2020-09-26 DIAGNOSIS — Z95.810 PRESENCE OF AUTOMATIC (IMPLANTABLE) CARDIAC DEFIBRILLATOR: Chronic | ICD-10-CM

## 2020-09-26 DIAGNOSIS — Z95.5 PRESENCE OF CORONARY ANGIOPLASTY IMPLANT AND GRAFT: Chronic | ICD-10-CM

## 2020-09-26 DIAGNOSIS — Z98.890 OTHER SPECIFIED POSTPROCEDURAL STATES: Chronic | ICD-10-CM

## 2020-09-26 LAB
ALBUMIN SERPL ELPH-MCNC: 3.9 G/DL — SIGNIFICANT CHANGE UP (ref 3.5–5.2)
ALP SERPL-CCNC: 121 U/L — HIGH (ref 30–115)
ALT FLD-CCNC: 135 U/L — HIGH (ref 0–41)
ANION GAP SERPL CALC-SCNC: 12 MMOL/L — SIGNIFICANT CHANGE UP (ref 7–14)
AST SERPL-CCNC: 113 U/L — HIGH (ref 0–41)
BASOPHILS # BLD AUTO: 0.03 K/UL — SIGNIFICANT CHANGE UP (ref 0–0.2)
BASOPHILS NFR BLD AUTO: 0.3 % — SIGNIFICANT CHANGE UP (ref 0–1)
BILIRUB SERPL-MCNC: 0.8 MG/DL — SIGNIFICANT CHANGE UP (ref 0.2–1.2)
BUN SERPL-MCNC: 30 MG/DL — HIGH (ref 10–20)
CALCIUM SERPL-MCNC: 9 MG/DL — SIGNIFICANT CHANGE UP (ref 8.5–10.1)
CHLORIDE SERPL-SCNC: 105 MMOL/L — SIGNIFICANT CHANGE UP (ref 98–110)
CO2 SERPL-SCNC: 22 MMOL/L — SIGNIFICANT CHANGE UP (ref 17–32)
CREAT SERPL-MCNC: 1.9 MG/DL — HIGH (ref 0.7–1.5)
D DIMER BLD IA.RAPID-MCNC: 291 NG/ML DDU — HIGH (ref 0–230)
EOSINOPHIL # BLD AUTO: 0.09 K/UL — SIGNIFICANT CHANGE UP (ref 0–0.7)
EOSINOPHIL NFR BLD AUTO: 1 % — SIGNIFICANT CHANGE UP (ref 0–8)
GLUCOSE SERPL-MCNC: 128 MG/DL — HIGH (ref 70–99)
HCT VFR BLD CALC: 43.7 % — SIGNIFICANT CHANGE UP (ref 42–52)
HGB BLD-MCNC: 13.9 G/DL — LOW (ref 14–18)
IMM GRANULOCYTES NFR BLD AUTO: 0.8 % — HIGH (ref 0.1–0.3)
LYMPHOCYTES # BLD AUTO: 1.44 K/UL — SIGNIFICANT CHANGE UP (ref 1.2–3.4)
LYMPHOCYTES # BLD AUTO: 16.7 % — LOW (ref 20.5–51.1)
MCHC RBC-ENTMCNC: 27.5 PG — SIGNIFICANT CHANGE UP (ref 27–31)
MCHC RBC-ENTMCNC: 31.8 G/DL — LOW (ref 32–37)
MCV RBC AUTO: 86.5 FL — SIGNIFICANT CHANGE UP (ref 80–94)
MONOCYTES # BLD AUTO: 0.9 K/UL — HIGH (ref 0.1–0.6)
MONOCYTES NFR BLD AUTO: 10.4 % — HIGH (ref 1.7–9.3)
NEUTROPHILS # BLD AUTO: 6.1 K/UL — SIGNIFICANT CHANGE UP (ref 1.4–6.5)
NEUTROPHILS NFR BLD AUTO: 70.8 % — SIGNIFICANT CHANGE UP (ref 42.2–75.2)
NRBC # BLD: 0 /100 WBCS — SIGNIFICANT CHANGE UP (ref 0–0)
NT-PROBNP SERPL-SCNC: 795 PG/ML — HIGH (ref 0–300)
PLATELET # BLD AUTO: 283 K/UL — SIGNIFICANT CHANGE UP (ref 130–400)
POTASSIUM SERPL-MCNC: 4.2 MMOL/L — SIGNIFICANT CHANGE UP (ref 3.5–5)
POTASSIUM SERPL-SCNC: 4.2 MMOL/L — SIGNIFICANT CHANGE UP (ref 3.5–5)
PROT SERPL-MCNC: 6.5 G/DL — SIGNIFICANT CHANGE UP (ref 6–8)
RBC # BLD: 5.05 M/UL — SIGNIFICANT CHANGE UP (ref 4.7–6.1)
RBC # FLD: 14.6 % — HIGH (ref 11.5–14.5)
SODIUM SERPL-SCNC: 139 MMOL/L — SIGNIFICANT CHANGE UP (ref 135–146)
TROPONIN T SERPL-MCNC: <0.01 NG/ML — SIGNIFICANT CHANGE UP
WBC # BLD: 8.63 K/UL — SIGNIFICANT CHANGE UP (ref 4.8–10.8)
WBC # FLD AUTO: 8.63 K/UL — SIGNIFICANT CHANGE UP (ref 4.8–10.8)

## 2020-09-26 PROCEDURE — 93010 ELECTROCARDIOGRAM REPORT: CPT

## 2020-09-26 PROCEDURE — 99285 EMERGENCY DEPT VISIT HI MDM: CPT

## 2020-09-26 PROCEDURE — 71045 X-RAY EXAM CHEST 1 VIEW: CPT | Mod: 26

## 2020-09-26 RX ORDER — ASPIRIN/CALCIUM CARB/MAGNESIUM 324 MG
1 TABLET ORAL
Qty: 0 | Refills: 0 | DISCHARGE

## 2020-09-26 RX ORDER — SACUBITRIL AND VALSARTAN 24; 26 MG/1; MG/1
1 TABLET, FILM COATED ORAL
Refills: 0 | Status: DISCONTINUED | OUTPATIENT
Start: 2020-09-27 | End: 2020-09-28

## 2020-09-26 RX ORDER — SODIUM CHLORIDE 9 MG/ML
1000 INJECTION, SOLUTION INTRAVENOUS ONCE
Refills: 0 | Status: COMPLETED | OUTPATIENT
Start: 2020-09-26 | End: 2020-09-26

## 2020-09-26 RX ORDER — AMIODARONE HYDROCHLORIDE 400 MG/1
1 TABLET ORAL
Qty: 0 | Refills: 0 | DISCHARGE

## 2020-09-26 RX ORDER — HEPARIN SODIUM 5000 [USP'U]/ML
5000 INJECTION INTRAVENOUS; SUBCUTANEOUS EVERY 8 HOURS
Refills: 0 | Status: DISCONTINUED | OUTPATIENT
Start: 2020-09-26 | End: 2020-09-28

## 2020-09-26 RX ORDER — METOPROLOL TARTRATE 50 MG
1 TABLET ORAL
Qty: 0 | Refills: 0 | DISCHARGE

## 2020-09-26 RX ORDER — GABAPENTIN 400 MG/1
1 CAPSULE ORAL
Qty: 0 | Refills: 0 | DISCHARGE

## 2020-09-26 RX ORDER — TICAGRELOR 90 MG/1
0 TABLET ORAL
Qty: 0 | Refills: 0 | DISCHARGE

## 2020-09-26 RX ORDER — ATORVASTATIN CALCIUM 80 MG/1
1 TABLET, FILM COATED ORAL
Qty: 0 | Refills: 0 | DISCHARGE

## 2020-09-26 RX ORDER — SACUBITRIL AND VALSARTAN 24; 26 MG/1; MG/1
1 TABLET, FILM COATED ORAL
Qty: 0 | Refills: 0 | DISCHARGE

## 2020-09-26 RX ORDER — AMIODARONE HYDROCHLORIDE 400 MG/1
200 TABLET ORAL
Refills: 0 | Status: DISCONTINUED | OUTPATIENT
Start: 2020-09-27 | End: 2020-09-28

## 2020-09-26 RX ORDER — GABAPENTIN 400 MG/1
300 CAPSULE ORAL DAILY
Refills: 0 | Status: DISCONTINUED | OUTPATIENT
Start: 2020-09-27 | End: 2020-09-28

## 2020-09-26 RX ORDER — ASPIRIN/CALCIUM CARB/MAGNESIUM 324 MG
81 TABLET ORAL DAILY
Refills: 0 | Status: DISCONTINUED | OUTPATIENT
Start: 2020-09-27 | End: 2020-09-28

## 2020-09-26 RX ADMIN — SODIUM CHLORIDE 1000 MILLILITER(S): 9 INJECTION, SOLUTION INTRAVENOUS at 17:01

## 2020-09-26 NOTE — H&P ADULT - NSHPPHYSICALEXAM_GEN_ALL_CORE
General: WN/WD NAD  Neurology: A&Ox3, nonfocal, SAHU x 4  Head:  Normocephalic, atraumatic  ENT:  Mucosa moist, no ulcerations  Neck:  Supple, no sinuses or palpable masses  Lymphatic:  No palpable cervical, supraclavicular, axillary or inguinal adenopathy  Respiratory: CTA B/L  CV: RRR, S1S2, no murmur  Abdominal: Soft, NT, ND no palpable mass  MSK: No edema  Incisions: intact, no erythema or drainage

## 2020-09-26 NOTE — H&P ADULT - ASSESSMENT
65 year old male with hx of cardiac arrest secondary to MI s/p PCI with 2 stents to the LAD in 2019, HFrEF w/ EF 45%, and VT s/p AICD presents with syncope possibly secondary to orthostatic hypotension vs. arrhythmia vs. vasovagal    # Syncope  - likely orthostatic hypotension exacerbated by dehydration as patient has been having lightheadedness in the AM // possibly symptomatic vtach however patient did not feel a shock  - patient received 1L LR in the ED, does not appear fluid overloaded  - admitting to tele // f/u echo, f/u trops, first set negative, f/u AICD interrogation  - will hold toprol 25 for now, f/u orthostatics  - f/u cardio    # CAD s/p MI and PCI to LAD x2  - c/w aspirin qd, holding toprol 25 for now, also holding atorvastatin 40 for now, consider restarting or starting at lower dose if no change in liver enzymes    # HFrEF EF 45%  - c/w entresto 49/51 bid, holding toprol for now  - received 1L LR in the ED, patient does not appear volume overloaded, no bl le edema and no fluid on chest xray,   - patient states creatinine of 1.9 is known baseline for him, hold entresto if elevated in the AM    # Vtach s/p AICD  - f/u interrogation, c/w amiodarone 200 bid  - patient states that [facility] HF team is aware of the liver function and still recommends c/w amiodarone    # Elevated liver enzymes  - mixed pattern, no abdominal pain, HF team aware as per patient  - f/u US abdomen, f/u hepatitis panel    PLAN: f/u orthostatics in the AM and restart metoprolol if negative, f/u liver enzymes, consider restarting statin at lower dose if improving, f/u interrogation and tele monitoring, f/u cardio recs    # DVT PPX: heparin sc  # GI PPX: not indicated  # Diet: DASH  # FULL CODE

## 2020-09-26 NOTE — ED ADULT NURSE NOTE - CHIEF COMPLAINT QUOTE
s/p syncopal episode while at dinner. as per son pt. was unresponsive for 5-10 seconds / hx of heart failure, MI and defibrillator

## 2020-09-26 NOTE — H&P ADULT - NSICDXPASTSURGICALHX_GEN_ALL_CORE_FT
PAST SURGICAL HISTORY:  AICD (automatic cardioverter/defibrillator) present     H/O hernia repair     Stented coronary artery x2

## 2020-09-26 NOTE — H&P ADULT - ATTENDING COMMENTS
65 year old male with hx of cardiac arrest secondary to MI s/p PCI with 2 stents to the LAD in 2019, HFrEF w/ EF 45%, and VT s/p AICD presents with syncope.  Patient was having dinner with family today when he started feeling diaphoretic and lightheaded.  While sitting down he passed out with no head trauma.  He was laid flat on the ground by his daughter and son and they were not able to get a pulse.  Before compressions were started, he regained consciousness after about 15 seconds.  He was not post ictal, had no focal weakness, no chest pain, no shortness of breath, he did not feel an ICD shock, no recent illness, cough, fever, chills, or n/v/d.       Of note he states that he has been getting lightheaded upon standing up quickly. This has been an ongoing issue since his MI, he does not take diuretics daily, only PRN for leg swelling. He follows closely with the HF team at [facility] and they are aware of these symptoms as well as his elevated liver enzymes and kidney function (creatinine 1.9).  In the ED he received 1L bolus of LR.    Triage vitals: /65  HR 61  RR 18  Temp 98.5  SpO2 97%      Physical Exam:  GENERAL: NAD, well-developed, Non-toxic, stated age   HEAD:  Atraumatic, Normocephalic  EYES: EOMI, Sclera White   NECK: Supple, No JVD  CHEST/LUNG: Clear to auscultation bilaterally; No wheezing, rhonchi, or crackles  HEART: Regular rate and rhythm; s1, s2, No murmurs, rubs, or gallops  ABDOMEN: Soft, Nontender, Nondistended; Bowel sounds present, No rebound or guarding noted   EXTREMITIES:  No lower extremity edema or calf tenderness to palpation.  No clubbing or cyanosis  PSYCH: AAOx3  NEUROLOGY: non-focal  SKIN: No rashes or lesions      Plan:  Syncope: Likely the event was vasovegal syncope given his history but he does appear to have a history of orthostatic hypotension.  - patient received 1L LR in the ED, does not appear fluid overloaded  - admitting to tele // f/u echo, f/u trops, first set negative, f/u AICD interrogation  - f/u orthostatics  - f/u cardio    CAD s/p MI and PCI to LAD x2  - c/w aspirin qd, continue toprol 25 for now,   - holding atorvastatin 40 for now, consider restarting or starting at lower dose if no change in liver enzymes    HFrEF EF 45%  - c/w entresto 49/51 bid, holding toprol for now  - received 1L LR in the ED, patient does not appear volume overloaded, no bl le edema and no fluid on chest xray,   - patient states creatinine of 1.9 is known baseline for him, hold entresto if elevated in the AM    Vtach s/p AICD  - f/u interrogation, c/w amiodarone 200 bid  - patient states that [facility] HF team is aware of the liver function and still recommends c/w amiodarone    Elevated liver enzymes  - mixed pattern, no abdominal pain, HF team aware as per patient  - f/u US abdomen, f/u hepatitis panel  - statin held for now, though if liver enzymes remain stable they should be restarted given his history and close monitoring of liver enzymes by HF team      DVT PPX: heparin sc   GI PPX: not indicated   Diet: DASH   FULL CODE    My note supersedes the resident's note should a discrepancy arise 65 year old male with hx of cardiac arrest secondary to MI s/p PCI with 2 stents to the LAD in 2019, HFrEF w/ EF 45%, and VT s/p AICD presents with syncope.  Patient was having dinner with family today when he started feeling diaphoretic and lightheaded.  While sitting down he passed out with no head trauma.  He was laid flat on the ground by his daughter and son and they were not able to get a pulse.  Before compressions were started, he regained consciousness after about 15 seconds.  He was not post ictal, had no focal weakness, no chest pain, no shortness of breath, he did not feel an ICD shock, no recent illness, cough, fever, chills, or n/v/d.       Of note he states that he has been getting lightheaded upon standing up quickly. This has been an ongoing issue since his MI, he does not take diuretics daily, only PRN for leg swelling. He follows closely with the HF team at [facility] and they are aware of these symptoms as well as his elevated liver enzymes and kidney function (creatinine 1.9).  In the ED he received 1L bolus of LR.    Triage vitals: /65  HR 61  RR 18  Temp 98.5  SpO2 97%      Physical Exam:  GENERAL: NAD, well-developed, Non-toxic, stated age   HEAD:  Atraumatic, Normocephalic  EYES: EOMI, Sclera White   NECK: Supple, No JVD  CHEST/LUNG: Clear to auscultation bilaterally; No wheezing, rhonchi, or crackles  HEART: Regular rate and rhythm; s1, s2, No murmurs, rubs, or gallops  ABDOMEN: Soft, Nontender, Nondistended; Bowel sounds present, No rebound or guarding noted   EXTREMITIES:  No lower extremity edema or calf tenderness to palpation.  No clubbing or cyanosis  PSYCH: AAOx3  NEUROLOGY: non-focal  SKIN: No rashes or lesions      Plan:  Syncope: Likely the event was vasovegal syncope given his history but he does appear to have a history of orthostatic hypotension.  - patient received 1L LR in the ED, does not appear fluid overloaded  - admitting to tele // f/u echo, f/u trops, first set negative, f/u AICD interrogation  - f/u orthostatics  - f/u cardio    CAD s/p MI and PCI to LAD x2  - c/w aspirin qd, continue toprol 25 for now,   - holding atorvastatin 40 for now, consider restarting or starting at lower dose if no change in liver enzymes    HFrEF EF 45%  - c/w entresto 49/51 bid, holding toprol for now  - received 1L LR in the ED, patient does not appear volume overloaded, no bl le edema and no fluid on chest xray,   - patient states creatinine of 1.9 is known baseline for him, hold entresto if elevated in the AM    Vtach s/p AICD  - f/u interrogation, c/w amiodarone 200 bid  - patient states that [facility] HF team is aware of the liver function and still recommends c/w amiodarone    Elevated liver enzymes  - mixed pattern, no abdominal pain, HF team aware as per patient  - f/u US abdomen, f/u hepatitis panel  - statin held for now, though if liver enzymes remain stable they should be restarted given his history and close monitoring of liver enzymes by HF team      DVT PPX: heparin sc   GI PPX: not indicated   Diet: DASH   FULL CODE    My note supersedes the resident's note should a discrepancy arise      Agree with above  Co-Signer: Neil Ye MD

## 2020-09-26 NOTE — ED PROVIDER NOTE - NS ED ROS FT
Eyes:  No visual changes, eye pain or discharge.  ENMT:  No hearing changes, pain, no sore throat or runny nose, no difficulty swallowing  Cardiac:  No chest pain, SOB or edema. No chest pain with exertion.  Respiratory:  No cough or respiratory distress. No hemoptysis. No history of asthma or RAD.  GI:  No nausea, vomiting, diarrhea or abdominal pain.  :  No dysuria, frequency or burning.  MS:  No myalgia, muscle weakness, joint pain or back pain.  Neuro:  No headache or weakness.  + LOC.  Skin:  No skin rash.   Endocrine: No history of thyroid disease or diabetes.

## 2020-09-26 NOTE — H&P ADULT - NSHPLABSRESULTS_GEN_ALL_CORE
13.9   8.63  )-----------( 283      ( 26 Sep 2020 16:45 )             43.7     09-26    139  |  105  |  30<H>  ----------------------------<  128<H>  4.2   |  22  |  1.9<H>    Ca    9.0      26 Sep 2020 16:45    TPro  6.5  /  Alb  3.9  /  TBili  0.8  /  DBili  x   /  AST  113<H>  /  ALT  135<H>  /  AlkPhos  121<H>  09-26    D-Dimer Assay, Quantitative: 291: Manufacturers recommended Cut off for VTE is 230 ng/ml D-DU ng/mL DDU     Troponin T, Serum: <0.01 ng/mL (09.26.20 @ 16:45)     Serum Pro-Brain Natriuretic Peptide: 795 pg/mL (09.26.20 @ 16:45)

## 2020-09-26 NOTE — ED PROVIDER NOTE - ATTENDING CONTRIBUTION TO CARE
64 yo M pmh chf s/p aicd, cad s/p stents pw syncope. Episode of syncope, lasted less than 1 minute. No seizure like activity, returned to baseline shortly after. Currently has no complaints. NO cp, no sob, no abd pain, no headache, no diaphoresis, no n/v, no back pain, no extremity pain, no fever/chills.     CONSTITUTIONAL: Well-developed; well-nourished; in no acute distress. Sitting up and providing appropriate history and physical examination  SKIN: skin exam is warm and dry, no acute rash.  HEAD: Normocephalic; atraumatic.  EYES: PERRL, 3 mm bilateral, no nystagmus, EOM intact; conjunctiva and sclera clear.  ENT: No nasal discharge; airway clear.  NECK: Supple; non tender. + full passive ROM in all directions. No JVD  CARD: S1, S2 normal; no murmurs, gallops, or rubs. Regular rate and rhythm. + Symmetric Strong Pulses  RESP: No wheezes, rales or rhonchi. Good air movement bilaterally  ABD: soft; non-distended; non-tender. No Rebound, No Guarding, No signs of peritonitis, No CVA tenderness. No pulsatile abdominal mass. + Strong and Symmetric Pulses  EXT: Normal ROM. No clubbing, cyanosis or edema. Dp and Pt Pulses intact. Cap refill less than 3 seconds  NEURO: CN 2-12 intact, normal finger to nose, normal romberg, stable gait, no sensory or motor deficits, Alert, oriented, grossly unremarkable. No Focal deficits. GCS 15. NIH 0  PSYCH: Cooperative, appropriate.

## 2020-09-26 NOTE — ED PROVIDER NOTE - PHYSICAL EXAMINATION
CONSTITUTIONAL: Well-developed; well-nourished; in no acute distress.   SKIN: warm, dry  HEAD: Normocephalic; atraumatic.  EYES: PERRL, EOMI, no conjunctival erythema  ENT: No nasal discharge; airway clear.  NECK: Supple; non tender.  CARD: S1, S2 normal; no murmurs, gallops, or rubs. Regular rate and rhythm.   RESP: No wheezes, rales or rhonchi.  ABD: soft ntnd  EXT: Normal ROM.  No clubbing, cyanosis or edema.   LYMPH: No acute cervical adenopathy.  NEURO: Alert, oriented. CN II - XII intact. No dysdiadochokinesia. Sensation grossly intact. Normal gait.   PSYCH: Cooperative, appropriate.

## 2020-09-26 NOTE — ED PROVIDER NOTE - CLINICAL SUMMARY MEDICAL DECISION MAKING FREE TEXT BOX
I personally evaluated the patient. I reviewed the Resident’s or Physician Assistant’s note (as assigned above), and agree with the findings and plan except as documented in my note. Patient evaluated for syncope. Labs, ekg, cxr performed. Discussed with patient's cardiology team at [facility]. Pt awake alert, VS stable upon admission. Admitted to medicine for further evaluation and treatment.

## 2020-09-26 NOTE — H&P ADULT - HISTORY OF PRESENT ILLNESS
65 year old male with hx of cardiac arrest secondary to MI s/p PCI with 2 stents to the LAD in 2019, HFrEF w/ EF 45%, and VT s/p AICD presents with syncope.  Patient was having dinner with family today when he started feeling diaphoretic and lightheaded. 65 year old male with hx of cardiac arrest secondary to MI s/p PCI with 2 stents to the LAD in 2019, HFrEF w/ EF 45%, and VT s/p AICD presents with syncope.  Patient was having dinner with family today when he started feeling diaphoretic and lightheaded.  While sitting down he passed out with no head trauma.  He was laid flat on the ground by his daughter and son and they were not able to get a pulse.  Before compressions were started, he regained consciousness after about 15 seconds.  He was not post ictal, had no focal weakness, no chest pain.      Of note he states that he has been getting lightheaded upon standing up quickly.  He follows closely with the HF team at [facility] and they are aware of these symptoms as well as his elevated liver enzymes and kidney function (creatinine 1.9).  In the ED he received 1L bolus of LR.    Triage vitals: /65  HR 61  RR 18  Temp 98.5  SpO2 97%

## 2020-09-26 NOTE — ED ADULT NURSE NOTE - OBJECTIVE STATEMENT
pt presented to ED, s/p syncopal episode while at dinner. As per son, pt was unresponsive for 5-10 seconds, no CPR interventions started. Pt has hx of MI.
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
## 2020-09-26 NOTE — ED PROVIDER NOTE - PROGRESS NOTE DETAILS
D-dimer elevated. Discussed with pt. Pt not unable to have contrast due to kidney function according to his specialist at [facility]. Discussed with cardio fellow. Will come interrogate defibrilllator. Discussed with CHF team from [facility]. Agree with plan for admission and interrogation of device. Patient and family do not want IV contrast for PE due to kidney fn.

## 2020-09-26 NOTE — ED PROVIDER NOTE - OBJECTIVE STATEMENT
65y M w/ PMH of cardiac arrest, and CHF s/p defibrillator presents with syncope. States was walking when he suddenly felt hot and lightheaded. Syncopized. + LOC. Awoke after couple min. Now asymptomatic. Denies fever, chills, CP, SOB, abd pain, n/v/d, or numbness/tingling.

## 2020-09-27 LAB
ALBUMIN SERPL ELPH-MCNC: 3.4 G/DL — LOW (ref 3.5–5.2)
ALP SERPL-CCNC: 114 U/L — SIGNIFICANT CHANGE UP (ref 30–115)
ALT FLD-CCNC: 123 U/L — HIGH (ref 0–41)
ANION GAP SERPL CALC-SCNC: 8 MMOL/L — SIGNIFICANT CHANGE UP (ref 7–14)
AST SERPL-CCNC: 106 U/L — HIGH (ref 0–41)
BASOPHILS # BLD AUTO: 0.04 K/UL — SIGNIFICANT CHANGE UP (ref 0–0.2)
BASOPHILS NFR BLD AUTO: 0.6 % — SIGNIFICANT CHANGE UP (ref 0–1)
BILIRUB SERPL-MCNC: 1.2 MG/DL — SIGNIFICANT CHANGE UP (ref 0.2–1.2)
BUN SERPL-MCNC: 23 MG/DL — HIGH (ref 10–20)
CALCIUM SERPL-MCNC: 8.6 MG/DL — SIGNIFICANT CHANGE UP (ref 8.5–10.1)
CHLORIDE SERPL-SCNC: 107 MMOL/L — SIGNIFICANT CHANGE UP (ref 98–110)
CO2 SERPL-SCNC: 26 MMOL/L — SIGNIFICANT CHANGE UP (ref 17–32)
CREAT SERPL-MCNC: 1.6 MG/DL — HIGH (ref 0.7–1.5)
EOSINOPHIL # BLD AUTO: 0.12 K/UL — SIGNIFICANT CHANGE UP (ref 0–0.7)
EOSINOPHIL NFR BLD AUTO: 1.9 % — SIGNIFICANT CHANGE UP (ref 0–8)
GLUCOSE SERPL-MCNC: 92 MG/DL — SIGNIFICANT CHANGE UP (ref 70–99)
HCT VFR BLD CALC: 42.1 % — SIGNIFICANT CHANGE UP (ref 42–52)
HGB BLD-MCNC: 13.2 G/DL — LOW (ref 14–18)
IMM GRANULOCYTES NFR BLD AUTO: 0.6 % — HIGH (ref 0.1–0.3)
LYMPHOCYTES # BLD AUTO: 1.35 K/UL — SIGNIFICANT CHANGE UP (ref 1.2–3.4)
LYMPHOCYTES # BLD AUTO: 21.7 % — SIGNIFICANT CHANGE UP (ref 20.5–51.1)
MAGNESIUM SERPL-MCNC: 1.7 MG/DL — LOW (ref 1.8–2.4)
MCHC RBC-ENTMCNC: 27.4 PG — SIGNIFICANT CHANGE UP (ref 27–31)
MCHC RBC-ENTMCNC: 31.4 G/DL — LOW (ref 32–37)
MCV RBC AUTO: 87.3 FL — SIGNIFICANT CHANGE UP (ref 80–94)
MONOCYTES # BLD AUTO: 0.72 K/UL — HIGH (ref 0.1–0.6)
MONOCYTES NFR BLD AUTO: 11.6 % — HIGH (ref 1.7–9.3)
NEUTROPHILS # BLD AUTO: 3.94 K/UL — SIGNIFICANT CHANGE UP (ref 1.4–6.5)
NEUTROPHILS NFR BLD AUTO: 63.6 % — SIGNIFICANT CHANGE UP (ref 42.2–75.2)
NRBC # BLD: 0 /100 WBCS — SIGNIFICANT CHANGE UP (ref 0–0)
PLATELET # BLD AUTO: 223 K/UL — SIGNIFICANT CHANGE UP (ref 130–400)
POTASSIUM SERPL-MCNC: 5 MMOL/L — SIGNIFICANT CHANGE UP (ref 3.5–5)
POTASSIUM SERPL-SCNC: 5 MMOL/L — SIGNIFICANT CHANGE UP (ref 3.5–5)
PROT SERPL-MCNC: 5.4 G/DL — LOW (ref 6–8)
RBC # BLD: 4.82 M/UL — SIGNIFICANT CHANGE UP (ref 4.7–6.1)
RBC # FLD: 14.4 % — SIGNIFICANT CHANGE UP (ref 11.5–14.5)
SARS-COV-2 RNA SPEC QL NAA+PROBE: SIGNIFICANT CHANGE UP
SODIUM SERPL-SCNC: 141 MMOL/L — SIGNIFICANT CHANGE UP (ref 135–146)
TROPONIN T SERPL-MCNC: <0.01 NG/ML — SIGNIFICANT CHANGE UP
TROPONIN T SERPL-MCNC: <0.01 NG/ML — SIGNIFICANT CHANGE UP
WBC # BLD: 6.21 K/UL — SIGNIFICANT CHANGE UP (ref 4.8–10.8)
WBC # FLD AUTO: 6.21 K/UL — SIGNIFICANT CHANGE UP (ref 4.8–10.8)

## 2020-09-27 PROCEDURE — 93282 PRGRMG EVAL IMPLANTABLE DFB: CPT | Mod: 26

## 2020-09-27 PROCEDURE — 76705 ECHO EXAM OF ABDOMEN: CPT | Mod: 26

## 2020-09-27 PROCEDURE — 99223 1ST HOSP IP/OBS HIGH 75: CPT | Mod: AI

## 2020-09-27 RX ORDER — METOPROLOL TARTRATE 50 MG
25 TABLET ORAL DAILY
Refills: 0 | Status: DISCONTINUED | OUTPATIENT
Start: 2020-09-27 | End: 2020-09-28

## 2020-09-27 RX ORDER — MAGNESIUM SULFATE 500 MG/ML
2 VIAL (ML) INJECTION EVERY 6 HOURS
Refills: 0 | Status: COMPLETED | OUTPATIENT
Start: 2020-09-27 | End: 2020-09-27

## 2020-09-27 RX ORDER — ATORVASTATIN CALCIUM 80 MG/1
40 TABLET, FILM COATED ORAL AT BEDTIME
Refills: 0 | Status: DISCONTINUED | OUTPATIENT
Start: 2020-09-27 | End: 2020-09-28

## 2020-09-27 RX ADMIN — Medication 25 GRAM(S): at 10:50

## 2020-09-27 RX ADMIN — SACUBITRIL AND VALSARTAN 1 TABLET(S): 24; 26 TABLET, FILM COATED ORAL at 18:34

## 2020-09-27 RX ADMIN — SACUBITRIL AND VALSARTAN 1 TABLET(S): 24; 26 TABLET, FILM COATED ORAL at 06:21

## 2020-09-27 RX ADMIN — GABAPENTIN 300 MILLIGRAM(S): 400 CAPSULE ORAL at 12:39

## 2020-09-27 RX ADMIN — Medication 25 GRAM(S): at 09:59

## 2020-09-27 RX ADMIN — AMIODARONE HYDROCHLORIDE 200 MILLIGRAM(S): 400 TABLET ORAL at 18:34

## 2020-09-27 RX ADMIN — Medication 81 MILLIGRAM(S): at 12:39

## 2020-09-27 RX ADMIN — Medication 25 MILLIGRAM(S): at 06:47

## 2020-09-27 RX ADMIN — AMIODARONE HYDROCHLORIDE 200 MILLIGRAM(S): 400 TABLET ORAL at 06:21

## 2020-09-27 NOTE — CHART NOTE - NSCHARTNOTEFT_GEN_A_CORE
65 year old male with hx of cardiac arrest secondary to MI s/p PCI with 2 stents to the LAD in 2019, HFrEF w/ EF 45%, and VT s/p AICD presents with syncope.  Patient was having dinner with family when he started feeling diaphoretic and lightheaded.  While sitting down he passed out with no head trauma.  He was laid flat on the ground by his daughter and son and they were not able to get a pulse.  Before compressions were started, he regained consciousness after about 15 seconds.  He was not post ictal, had no focal weakness, no chest pain, no shortness of breath, he did not feel an ICD shock, no recent illness, cough, fever, chills, or n/v/d.       Of note he states that he has been getting lightheaded upon standing up quickly. This has been an ongoing issue since his MI, he does not take diuretics daily, only PRN for leg swelling. He follows closely with the HF team at [facility] and they are aware of these symptoms as well as his elevated liver enzymes and kidney function (creatinine 1.9).  In the ED he received 1L bolus of LR.      #Syncope: Likely the event was vasovagal syncope given his history but he does appear to have a history of orthostatic hypotension. Rule out Cardiogenic cause  - patient received 1L LR in the ED, does not appear fluid overloaded  -  f/u echo, f/u trops, f/u AICD interrogation  - f/u orthostatics  - Approved for upgrade to CCU by cardiology fellow to rule out cardiac cause of syncope   - Hypomagnesemia repleted     #CAD s/p MI and PCI to LAD x2  - c/w aspirin and toprol 25 for now,   - holding atorvastatin 40 for now, consider restarting or starting at lower dose if no change in liver enzymes    #HFrEF EF 45%  - c/w entresto 49/51 bid and metoprolol   - received 1L LR in the ED, patient does not appear volume overloaded, no bl le edema and no fluid on chest xray,   - patient states creatinine of 1.9 is known baseline for him, hold entresto if worsens     #V.tach s/p AICD  - f/u interrogation, c/w amiodarone 200 BID  - patient states that [facility] HF team is aware of the liver function and still recommends c/w amiodarone    #Elevated liver enzymes  - mixed pattern, no abdominal pain, HF team aware as per patient  - f/u US abdomen, f/u hepatitis panel  - statin held for now, though if liver enzymes remain stable they should be restarted given his history and close monitoring of liver enzymes by HF team     TO FOLLOW:  - troponin  - Cardiology recommendations 65 year old male with hx of cardiac arrest secondary to MI s/p PCI with 2 stents to the LAD in 2019, HFrEF w/ EF 45%, and VT s/p AICD presents with syncope.  Patient was having dinner with family when he started feeling diaphoretic and lightheaded.  While sitting down he passed out with no head trauma.  He was laid flat on the ground by his daughter and son and they were not able to get a pulse.  Before compressions were started, he regained consciousness after about 15 seconds.  He was not post ictal, had no focal weakness, no chest pain, no shortness of breath, he did not feel an ICD shock, no recent illness, cough, fever, chills, or n/v/d.       Of note he states that he has been getting lightheaded upon standing up quickly. This has been an ongoing issue since his MI, he does not take diuretics daily, only PRN for leg swelling. He follows closely with the HF team at [facility] and they are aware of these symptoms as well as his elevated liver enzymes and kidney function (creatinine 1.9).  In the ED he received 1L bolus of LR.      #Syncope: Likely the event was vasovagal syncope given his history but he does appear to have a history of orthostatic hypotension. Rule out Cardiogenic cause  - patient received 1L LR in the ED, does not appear fluid overloaded  -  f/u echo, f/u trops, f/u AICD interrogation  - f/u orthostatics  - Approved for upgrade to CCU by cardiology fellow to rule out cardiac cause of syncope   - Hypomagnesemia repleted     #CAD s/p MI and PCI to LAD x2  - c/w aspirin and toprol 25 for now,   - holding atorvastatin 40 for now, consider restarting or starting at lower dose if no change in liver enzymes    #HFrEF EF 45%  - c/w entresto 49/51 bid and metoprolol   - received 1L LR in the ED, patient does not appear volume overloaded, no bl le edema and no fluid on chest xray,   - patient states creatinine of 1.9 is known baseline for him, hold entresto if worsens     #V.tach s/p AICD  - f/u interrogation, c/w amiodarone 200 BID  - patient states that [facility] HF team is aware of the liver function and still recommends c/w amiodarone    #Elevated liver enzymes  - mixed pattern, no abdominal pain, HF team aware as per patient  - f/u US abdomen, f/u hepatitis panel  - statin held for now, though if liver enzymes remain stable they should be restarted given his history and close monitoring of liver enzymes by HF team     TO FOLLOW:  - troponin  - Cardiology recommendations    SIGNED OUT TO CCU [phone]

## 2020-09-27 NOTE — CONSULT NOTE ADULT - ASSESSMENT
IMPRESSION:  - Syncope likely cardiac in origin R/O malignant arrhythmia (VT/VF)  (Non-exertional, at rest, while sitting, short duration 15 sec, associated with diaphoresis, full recovery after episode)    PLAN:  - Admit to CCU  - Get EP to interrogate device  - Continue toprol and amiodarone as home dose  - Continue aspirin, entresto, and atorvastatin  - Repeat ECG  - 2d echo  - Will follow IMPRESSION:  - Syncope likely vasovagal  - Cardiac syncope is ruled out (no events on ICD interrogation)    PLAN:  - Keep in CCU overnight  - Device interrogation by EP: No events  - Continue toprol and amiodarone as home dose  - Continue aspirin, entresto, and atorvastatin (LFTs noted)  - Repeat ECG  - 2d echo  - Plan for D/C tomorrow if no events overnight  - Will follow

## 2020-09-27 NOTE — CONSULT NOTE ADULT - SUBJECTIVE AND OBJECTIVE BOX
HPI:  65 year old male with hx of cardiac arrest secondary to MI s/p PCI with 2 stents to the LAD in 2019, HFrEF w/ EF 45%, and VT s/p AICD, CKD Cr 2, presents with syncope.  Patient was having dinner with family today when he started feeling diaphoretic and lightheaded.  While sitting down he passed out with no head trauma.  He was laid flat on the ground by his daughter and son and they were not able to get a pulse.  Before compressions were started, he regained consciousness after about 15 seconds.  He was not post ictal, had no focal weakness, no chest pain.      Of note he states that he has been getting lightheaded upon standing up quickly.  He follows closely with the HF team at [facility] and they are aware of these symptoms as well as his elevated liver enzymes and kidney function (creatinine 1.9).  In the ED he received 1L bolus of LR.    PAST MEDICAL & SURGICAL HISTORY  MI (myocardial infarction)  H/O hernia repair  AICD (automatic cardioverter/defibrillator) present  Stented coronary artery  x2    FAMILY HISTORY:  History of CAD father and mother    SOCIAL HISTORY:  []smoker  []Alcohol  []Drug    ALLERGIES:  No Known Allergies    MEDICATIONS:  MEDICATIONS  (STANDING):  aMIOdarone    Tablet 200 milliGRAM(s) Oral two times a day  aspirin  chewable 81 milliGRAM(s) Oral daily  gabapentin 300 milliGRAM(s) Oral daily  heparin   Injectable 5000 Unit(s) SubCutaneous every 8 hours  magnesium sulfate  IVPB 2 Gram(s) IV Intermittent every 6 hours  metoprolol succinate ER 25 milliGRAM(s) Oral daily  sacubitril 49 mG/valsartan 51 mG 1 Tablet(s) Oral two times a day    HOME MEDICATIONS:  amiodarone 200 mg oral tablet: 1 tab(s) orally 2 times a day (26 Sep 2020 21:09)  aspirin 81 mg oral tablet, chewable: 1 tab(s) orally once a day (26 Sep 2020 21:09)  atorvastatin 40 mg oral tablet: 1 tab(s) orally once a day (26 Sep 2020 21:09)  Entresto 49 mg-51 mg oral tablet: 1 tab(s) orally 2 times a day (26 Sep 2020 21:09)  gabapentin 300 mg oral tablet: 1 dose(s) orally once a day (26 Sep 2020 21:09)  Toprol-XL 25 mg oral tablet, extended release: 1 tab(s) orally once a day (26 Sep 2020 21:09)    VITALS:   T(F): 97.6 (09-27 @ 07:36), Max: 98.7 (09-26 @ 20:31)  HR: 61 (09-27 @ 07:36) (59 - 68)  BP: 94/54 (09-27 @ 07:36) (94/54 - 151/79)  BP(mean): --  RR: 18 (09-27 @ 07:36) (18 - 18)  SpO2: 100% (09-27 @ 07:36) (97% - 100%)    I&O's Summary    REVIEW OF SYSTEMS:  CONSTITUTIONAL: No weakness, fevers or chills  EYES: No visual changes  ENT: No vertigo or throat pain   NECK: No pain or stiffness  RESPIRATORY: No cough, wheezing, hemoptysis; No shortness of breath  CARDIOVASCULAR: No chest pain or palpitations  GASTROINTESTINAL: No abdominal or epigastric pain. No nausea, vomiting, or hematemesis; No diarrhea or constipation. No melena or hematochezia.  GENITOURINARY: No dysuria, frequency or hematuria  NEUROLOGICAL: No numbness or weakness. (+) syncope  SKIN: No itching, no rashes  MSK: No pain    PHYSICAL EXAM:  NEURO: patient is awake , alert and oriented  GEN: Not in acute distress  NECK: no thyroid enlargement, no JVD  LUNGS: Clear to auscultation bilaterally   CARDIOVASCULAR: S1/S2 present, RRR , no murmurs or rubs, no carotid bruits,  + PP bilaterally  ABD: Soft, non-tender, non-distended, +BS  EXT: No ABDULLAHI  SKIN: Intact    LABS:                        13.2   6.21  )-----------( 223      ( 27 Sep 2020 06:06 )             42.1     09-27    141  |  107  |  23<H>  ----------------------------<  92  5.0   |  26  |  1.6<H>    Ca    8.6      27 Sep 2020 06:06  Mg     1.7     09-27    TPro  5.4<L>  /  Alb  3.4<L>  /  TBili  1.2  /  DBili  x   /  AST  106<H>  /  ALT  123<H>  /  AlkPhos  114  09-27    Troponin T, Serum: <0.01 ng/mL (09-27-20 @ 06:06)  Troponin T, Serum: <0.01 ng/mL (09-26-20 @ 16:45)    CARDIAC MARKERS ( 27 Sep 2020 06:06 )  x     / <0.01 ng/mL / x     / x     / x      CARDIAC MARKERS ( 26 Sep 2020 16:45 )  x     / <0.01 ng/mL / x     / x     / x        Serum Pro-Brain Natriuretic Peptide: 795 pg/mL (09-26-20 @ 16:45)    RADIOLOGY:  -CXR: No acute cardiopulmonary disease    -TTE: Non on records    ECG:  sinus fabián to 59 bpm, old anterior wall MI    TELEMETRY EVENTS:

## 2020-09-27 NOTE — ED ADULT NURSE REASSESSMENT NOTE - NS ED NURSE REASSESS COMMENT FT1
Received pt from previous rn. pt aox4 in no acute distress. denies headache/ chest pain / dizziness. respirations even/ unlabored. on continuous cardiac monitor.  pt hypotensive, denies symptoms. resting comfortable. Will continue to monitor / assess

## 2020-09-27 NOTE — CHART NOTE - NSCHARTNOTEFT_GEN_A_CORE
Electrophysiology    Pts device __Single lead ICD [place] [employer]____ was interrogated on 09-27-20  Device working properly  Events: no events  Preliminary results d/w with housestaff 3104  Awaiting / Reviewed by attending    Contact EP ACP with any questions [phone]

## 2020-09-28 ENCOUNTER — TRANSCRIPTION ENCOUNTER (OUTPATIENT)
Age: 65
End: 2020-09-28

## 2020-09-28 VITALS
SYSTOLIC BLOOD PRESSURE: 127 MMHG | RESPIRATION RATE: 18 BRPM | OXYGEN SATURATION: 98 % | HEART RATE: 63 BPM | TEMPERATURE: 98 F | DIASTOLIC BLOOD PRESSURE: 62 MMHG

## 2020-09-28 LAB
ALBUMIN SERPL ELPH-MCNC: 2.7 G/DL — LOW (ref 3.5–5.2)
ALP SERPL-CCNC: 113 U/L — SIGNIFICANT CHANGE UP (ref 30–115)
ALT FLD-CCNC: 115 U/L — HIGH (ref 0–41)
ANION GAP SERPL CALC-SCNC: 5 MMOL/L — LOW (ref 7–14)
AST SERPL-CCNC: 88 U/L — HIGH (ref 0–41)
BASOPHILS # BLD AUTO: 0.03 K/UL — SIGNIFICANT CHANGE UP (ref 0–0.2)
BASOPHILS NFR BLD AUTO: 0.5 % — SIGNIFICANT CHANGE UP (ref 0–1)
BILIRUB SERPL-MCNC: 0.6 MG/DL — SIGNIFICANT CHANGE UP (ref 0.2–1.2)
BUN SERPL-MCNC: 23 MG/DL — HIGH (ref 10–20)
CALCIUM SERPL-MCNC: 8 MG/DL — LOW (ref 8.5–10.1)
CHLORIDE SERPL-SCNC: 107 MMOL/L — SIGNIFICANT CHANGE UP (ref 98–110)
CO2 SERPL-SCNC: 28 MMOL/L — SIGNIFICANT CHANGE UP (ref 17–32)
CREAT SERPL-MCNC: 1.5 MG/DL — SIGNIFICANT CHANGE UP (ref 0.7–1.5)
EOSINOPHIL # BLD AUTO: 0.13 K/UL — SIGNIFICANT CHANGE UP (ref 0–0.7)
EOSINOPHIL NFR BLD AUTO: 2 % — SIGNIFICANT CHANGE UP (ref 0–8)
GLUCOSE SERPL-MCNC: 100 MG/DL — HIGH (ref 70–99)
HAV IGM SER-ACNC: SIGNIFICANT CHANGE UP
HBV CORE IGM SER-ACNC: SIGNIFICANT CHANGE UP
HBV SURFACE AG SER-ACNC: SIGNIFICANT CHANGE UP
HCT VFR BLD CALC: 38.2 % — LOW (ref 42–52)
HCV AB S/CO SERPL IA: 0.04 COI — SIGNIFICANT CHANGE UP
HCV AB S/CO SERPL IA: 0.11 S/CO — SIGNIFICANT CHANGE UP (ref 0–0.99)
HCV AB SERPL-IMP: SIGNIFICANT CHANGE UP
HCV AB SERPL-IMP: SIGNIFICANT CHANGE UP
HGB BLD-MCNC: 12.3 G/DL — LOW (ref 14–18)
IMM GRANULOCYTES NFR BLD AUTO: 0.5 % — HIGH (ref 0.1–0.3)
LYMPHOCYTES # BLD AUTO: 1.34 K/UL — SIGNIFICANT CHANGE UP (ref 1.2–3.4)
LYMPHOCYTES # BLD AUTO: 20.5 % — SIGNIFICANT CHANGE UP (ref 20.5–51.1)
MAGNESIUM SERPL-MCNC: 2 MG/DL — SIGNIFICANT CHANGE UP (ref 1.8–2.4)
MCHC RBC-ENTMCNC: 27.6 PG — SIGNIFICANT CHANGE UP (ref 27–31)
MCHC RBC-ENTMCNC: 32.2 G/DL — SIGNIFICANT CHANGE UP (ref 32–37)
MCV RBC AUTO: 85.7 FL — SIGNIFICANT CHANGE UP (ref 80–94)
MONOCYTES # BLD AUTO: 0.85 K/UL — HIGH (ref 0.1–0.6)
MONOCYTES NFR BLD AUTO: 13 % — HIGH (ref 1.7–9.3)
NEUTROPHILS # BLD AUTO: 4.15 K/UL — SIGNIFICANT CHANGE UP (ref 1.4–6.5)
NEUTROPHILS NFR BLD AUTO: 63.5 % — SIGNIFICANT CHANGE UP (ref 42.2–75.2)
NRBC # BLD: 0 /100 WBCS — SIGNIFICANT CHANGE UP (ref 0–0)
PLATELET # BLD AUTO: 203 K/UL — SIGNIFICANT CHANGE UP (ref 130–400)
POTASSIUM SERPL-MCNC: 4.4 MMOL/L — SIGNIFICANT CHANGE UP (ref 3.5–5)
POTASSIUM SERPL-SCNC: 4.4 MMOL/L — SIGNIFICANT CHANGE UP (ref 3.5–5)
PROT SERPL-MCNC: 4.7 G/DL — LOW (ref 6–8)
RBC # BLD: 4.46 M/UL — LOW (ref 4.7–6.1)
RBC # FLD: 14.4 % — SIGNIFICANT CHANGE UP (ref 11.5–14.5)
SODIUM SERPL-SCNC: 140 MMOL/L — SIGNIFICANT CHANGE UP (ref 135–146)
WBC # BLD: 6.53 K/UL — SIGNIFICANT CHANGE UP (ref 4.8–10.8)
WBC # FLD AUTO: 6.53 K/UL — SIGNIFICANT CHANGE UP (ref 4.8–10.8)

## 2020-09-28 PROCEDURE — 93306 TTE W/DOPPLER COMPLETE: CPT | Mod: 26

## 2020-09-28 PROCEDURE — 99239 HOSP IP/OBS DSCHRG MGMT >30: CPT

## 2020-09-28 RX ADMIN — GABAPENTIN 300 MILLIGRAM(S): 400 CAPSULE ORAL at 11:20

## 2020-09-28 RX ADMIN — SACUBITRIL AND VALSARTAN 1 TABLET(S): 24; 26 TABLET, FILM COATED ORAL at 06:57

## 2020-09-28 RX ADMIN — SACUBITRIL AND VALSARTAN 1 TABLET(S): 24; 26 TABLET, FILM COATED ORAL at 17:16

## 2020-09-28 RX ADMIN — AMIODARONE HYDROCHLORIDE 200 MILLIGRAM(S): 400 TABLET ORAL at 17:16

## 2020-09-28 RX ADMIN — Medication 81 MILLIGRAM(S): at 11:20

## 2020-09-28 RX ADMIN — AMIODARONE HYDROCHLORIDE 200 MILLIGRAM(S): 400 TABLET ORAL at 06:57

## 2020-09-28 NOTE — DISCHARGE NOTE PROVIDER - NSDCFUADDINST_GEN_ALL_CORE_FT
Please follow up with your primary care physician within 1 week of your discharge from [facility]. Please take all medications as prescribed. If you experience any worsening or recurrence of your symptoms, please call [phone] immediately or report to the nearest Emergency Department. If you have any questions or concerns, please do not hesitate to call the hospital at [phone].

## 2020-09-28 NOTE — PROGRESS NOTE ADULT - I WAS PHYSICALLY PRESENT FOR THE KEY PORTIONS OF THE EVALUATION AND MANAGEMENT (E/M) SERVICE PROVIDED.  I AGREE WITH THE ABOVE HISTORY, PHYSICAL, AND PLAN WHICH I HAVE REVIEWED AND EDITED WHERE APPROPRIATE
Subjective:      Patient ID: Melvi Bradford is a 61 y.o. female.    Chief Complaint: Diabetes Mellitus (PCP: Maryam Godinez 06/08/2018  A1C: 06/08/2018 / 5.7); Routine Foot Care; and Nail Problem (Fungus on both great toes)    Melvi is a 61 y.o. female who presents to the clinic for evaluation and treatment of high risk feet. Melvi has a past medical history of Anemia, Anxiety, Arthritis, Asthma, Coronary artery disease, Depression, Diabetes mellitus, type 2, Glaucoma, Hyperlipidemia, Hypertension, MI (myocardial infarction) (03/2005), Myocardial infarction, PVD (peripheral vascular disease), Reflux, and Stroke (06/2003). The patient's chief complaint is long, thick toenails and burning increasing pain to her feet.  She manriquez take gabapentin 300 mg but this really not working shoes.  She is take more than that which seem to be affected.  She also does have a history of peripheral vascular disease.  This patient has documented high risk feet requiring routine maintenance secondary to peripheral neuropathy.    PCP: Maryam Godinez MD        Current shoe gear:  Affected Foot: Casual shoes     Unaffected Foot: Casual shoes    Hemoglobin A1C   Date Value Ref Range Status   06/08/2018 5.7 (H) 4.0 - 5.6 % Final     Comment:     ADA Screening Guidelines:  5.7-6.4%  Consistent with prediabetes  >or=6.5%  Consistent with diabetes  High levels of fetal hemoglobin interfere with the HbA1C  assay. Heterozygous hemoglobin variants (HbS, HgC, etc)do  not significantly interfere with this assay.   However, presence of multiple variants may affect accuracy.     03/07/2018 5.7 (H) 4.0 - 5.6 % Final     Comment:     According to ADA guidelines, hemoglobin A1c <7.0% represents  optimal control in non-pregnant diabetic patients. Different  metrics may apply to specific patient populations.   Standards of Medical Care in Diabetes-2016.  For the purpose of screening for the presence of diabetes:  <5.7%     Consistent with the  absence of diabetes  5.7-6.4%  Consistent with increasing risk for diabetes   (prediabetes)  >or=6.5%  Consistent with diabetes  Currently, no consensus exists for use of hemoglobin A1c  for diagnosis of diabetes for children.  This Hemoglobin A1c assay has significant interference with fetal   hemoglobin   (HbF). The results are invalid for patients with abnormal amounts of   HbF,   including those with known Hereditary Persistence   of Fetal Hemoglobin. Heterozygous hemoglobin variants (HbAS, HbAC,   HbAD, HbAE, HbA2) do not significantly interfere with this assay;   however, presence of multiple variants in a sample may impact the %   interference.     12/05/2017 5.5 4.0 - 5.6 % Final     Comment:     According to ADA guidelines, hemoglobin A1c <7.0% represents  optimal control in non-pregnant diabetic patients. Different  metrics may apply to specific patient populations.   Standards of Medical Care in Diabetes-2016.  For the purpose of screening for the presence of diabetes:  <5.7%     Consistent with the absence of diabetes  5.7-6.4%  Consistent with increasing risk for diabetes   (prediabetes)  >or=6.5%  Consistent with diabetes  Currently, no consensus exists for use of hemoglobin A1c  for diagnosis of diabetes for children.  This Hemoglobin A1c assay has significant interference with fetal   hemoglobin   (HbF). The results are invalid for patients with abnormal amounts of   HbF,   including those with known Hereditary Persistence   of Fetal Hemoglobin. Heterozygous hemoglobin variants (HbAS, HbAC,   HbAD, HbAE, HbA2) do not significantly interfere with this assay;   however, presence of multiple variants in a sample may impact the %   interference.         Past Medical History:   Diagnosis Date    Anemia     Anxiety     Arthritis     Asthma     Coronary artery disease     Depression     Diabetes mellitus, type 2     Glaucoma     Hyperlipidemia     Hypertension     MI (myocardial infarction)  03/2005    S[facility] in 3/[facility]     Myocardial infarction     PVD (peripheral vascular disease)     Reflux     Stroke 06/2003       Past Surgical History:   Procedure Laterality Date    BALLOON ANGIOPLASTY, ARTERY Right 11/6/2015    right leg    CORONARY STENT PLACEMENT  2005    EYE SURGERY Left 08/2016    x2 implant and cataract removal    FEMORAL ARTERY STENT Left 12/2015    HERNIA REPAIR  1978    HYSTERECTOMY  1999    TUBAL LIGATION  1987       Family History   Problem Relation Age of Onset    Hyperlipidemia Brother     Hypertension Brother     Stroke Brother     Heart disease Brother     No Known Problems Daughter     No Known Problems Son     No Known Problems Daughter     No Known Problems Son     Breast cancer Sister     Hypertension Sister     Hypertension Sister     No Known Problems Brother     Heart disease Mother     Glaucoma Mother        Social History     Socioeconomic History    Marital status: Single     Spouse name: None    Number of children: 4    Years of education: None    Highest education level: None   Social Needs    Financial resource strain: None    Food insecurity - worry: None    Food insecurity - inability: None    Transportation needs - medical: None    Transportation needs - non-medical: None   Occupational History    Occupation: disabled   Tobacco Use    Smoking status: Never Smoker    Smokeless tobacco: Never Used   Substance and Sexual Activity    Alcohol use: No    Drug use: No    Sexual activity: Yes     Partners: Male     Birth control/protection: Post-menopausal   Other Topics Concern    None   Social History Narrative    None       Current Outpatient Medications   Medication Sig Dispense Refill    ACCU-CHEK KAIA PLUS METER Misc       ACCU-CHEK KAIA PLUS TEST STRP Strp TEST BLOOD SUGAR EVERY  strip 0    ACCU-CHEK SOFTCLIX LANCETS Misc qd 100 each 3    amLODIPine (NORVASC) 10 MG tablet Take 1 tablet (10 mg  total) by mouth once daily. 90 tablet 3    aspirin (ECOTRIN) 81 MG EC tablet Take 81 mg by mouth every morning.       azelastine (ASTELIN) 137 mcg (0.1 %) nasal spray 1 spray (137 mcg total) by Nasal route 2 (two) times daily. 30 mL 0    blood-glucose meter kit Qd testing 1 each 0    budesonide-formoterol 160-4.5 mcg (SYMBICORT) 160-4.5 mcg/actuation HFAA Inhale 2 puffs into the lungs as needed. 2 puffs qd (Patient taking differently: Inhale 2 puffs into the lungs 2 (two) times daily as needed. ) 10.2 g 3    clopidogrel (PLAVIX) 75 mg tablet Take 1 tablet (75 mg total) by mouth once daily. 90 tablet 3    cyclobenzaprine (FLEXERIL) 5 MG tablet Take 1 tablet (5 mg total) by mouth nightly as needed. 20 tablet 0    diaper,brief,adult,disposable Misc       docusate sodium (COLACE) 100 MG capsule Take 100 mg by mouth as needed for Constipation.       doxazosin (CARDURA) 2 MG tablet TAKE ONE TABLET BY MOUTH ONCE DAILY IN THE EVENING (Patient taking differently: Take 2 mg by mouth every evening. TAKE ONE TABLET BY MOUTH ONCE DAILY IN THE EVENING) 90 tablet 3    escitalopram oxalate (LEXAPRO) 20 MG tablet Take 1 tablet (20 mg total) by mouth once daily. 90 tablet 3    ferrous sulfate (IRON) 325 mg (65 mg iron) Tab tablet Take 1 tablet (325 mg total) by mouth once daily. (Patient taking differently: Take 325 mg by mouth. Takes a few times per week) 90 tablet 3    hydroCHLOROthiazide (HYDRODIURIL) 50 MG tablet Take 1 tablet (50 mg total) by mouth once daily. 30 tablet 11    hydrOXYzine (ATARAX) 50 MG tablet Take 1 tablet (50 mg total) by mouth 4 (four) times daily as needed. for anxiety. 120 tablet 0    incontinence pad,liner,disp (BLADDER CONTROL PADS EX ABSORB MISC)       ketoconazole (NIZORAL) 2 % cream Apply topically once daily. 30 g 0    lisinopril (PRINIVIL,ZESTRIL) 20 MG tablet Take 1 tablet (20 mg total) by mouth once daily. 90 tablet 3    metformin (GLUCOPHAGE) 500 MG tablet TAKE ONE TABLET BY MOUTH  ONCE DAILY WITH BREAKFAST (Patient taking differently: Take 500 mg by mouth daily with breakfast. TAKE ONE TABLET BY MOUTH ONCE DAILY WITH BREAKFAST) 90 tablet 3    metoprolol tartrate (LOPRESSOR) 100 MG tablet Take 1 tablet (100 mg total) by mouth 2 (two) times daily. 180 tablet 3    nitroGLYCERIN (NITROSTAT) 0.4 MG SL tablet DISSOLVE ONE TABLET UNDER THE TONGUE EVERY 5 MINUTES AS NEEDED FOR CHEST PAIN.  DO NOT EXCEED A TOTAL OF 3 DOSES IN 15 MINUTES 50 tablet 3    oxybutynin (DITROPAN-XL) 5 MG TR24 Take 1 tablet (5 mg total) by mouth once daily. 90 tablet 3    pantoprazole (PROTONIX) 40 MG tablet Take 1 tablet (40 mg total) by mouth once daily. 30 tablet 11    prednisoLONE acetate (PRED FORTE) 1 % DrpS INSTILL ONE DROP INTO LEFT EYE 4 TIMES DAILY (Patient taking differently: INSTILL ONE DROP INTO LEFT EYE 2 TIMES DAILY) 1 Bottle 3    triamcinolone acetonide 0.1% (KENALOG) 0.1 % cream Apply topically 2 (two) times daily. to the affected area(s)      albuterol 90 mcg/actuation inhaler Inhale 2 puffs into the lungs every 4 (four) hours as needed for Wheezing. Rescue 1 Inhaler 0    atorvastatin (LIPITOR) 80 MG tablet Take 1 tablet (80 mg total) by mouth every evening. 90 tablet 3    gabapentin (NEURONTIN) 300 MG capsule Take 1 capsule (300 mg total) by mouth 3 (three) times daily. 90 capsule 0    terbinafine HCl (LAMISIL) 250 mg tablet Take 1 tablet (250 mg total) by mouth once daily. 90 tablet 0    urea (CARMOL) 40 % Crea Apply topically once daily. 198.6 g 11     No current facility-administered medications for this visit.        Review of patient's allergies indicates:   Allergen Reactions    Omnicef [cefdinir] Swelling     Swelling (tongue / lips)^Everything swollen  Lips, tongue, went to the ED    Powder base no.196 Itching     Will have itching if she wears gloves with powder in them         ROS  ROS:  Constitution: Negative for chills, fever, weakness and malaise/fatigue.   HEENT: Negative for  headaches.   Cardiovascular: Negative for chest pain and claudication.   Respiratory: Negative for cough and shortness of breath.   Musculoskeletal: (+) for foot pain.  Negative for muscle cramps and muscle weakness.   Gastrointestinal: Negative for nausea and vomiting.   Neurological:(+) for numbness, tingling and paresthesias.   Dermatological:  - for skin rash, (--) for keratosis, (+) for fungal toenails, (--) for wound.          Objective:      Physical Exam  Constitutional:  Patient is oriented to person, place, and time. Vital signs are normal.  Appears well-developed and well-nourished.     Vascular:  Dorsalis pedis pulses are 2/4 on the right side, and 2/4 on the left side.   Posterior tibial pulses are 2/4 on the right side, and 2/4 on the left side.   (+) digital hair growth, capillary fill time to all toes <3 seconds, toes are warm touch, trace pedal swelling    Skin/Dermatological:  Skin is warm and intact.  No cyanosis or clubbing.  No rashes noted.  No open wounds.  Right 1, 2, 3, 4, 5 and left 1, 2, 3, 4, 5  toenails yellow discolored, thickened 2-4 mm to base with subungual debris.  (--) keratosis     Musculoskeletal:      Hallux abducto valgus bilaterally, hammertoes 2-5 observed.  Pedal rom within normal limits.  (--) ankle joint DF restriction with both knee flexed and extened.    Neurological:  (+) deficits to sharp/dull, light touch or vibratory sensation bilateral feet, ten points tested.   Muscle strength to tibialis anterior, extensor hallucis longus, extensor digitorum longus, peroneal muscles, flexor hallucis/digotorum longus, posterior tibial and gastrosoleal complex is 5/5, normal tone without assymmetry   Patellar reflexes are 2+ on the right side and 2+ on the left side.  Achilles reflexes are 2+ on the right side and 2+ on the left side.          Assessment:       Encounter Diagnoses   Name Primary?    Diabetic peripheral neuropathy associated with type 2 diabetes mellitus Yes     Onychomycosis due to dermatophyte     Xerosis cutis          Plan:       Melvi was seen today for diabetes mellitus, routine foot care and nail problem.    Diagnoses and all orders for this visit:    Diabetic peripheral neuropathy associated with type 2 diabetes mellitus  -     gabapentin (NEURONTIN) 300 MG capsule; Take 1 capsule (300 mg total) by mouth 3 (three) times daily.    Onychomycosis due to dermatophyte  -     terbinafine HCl (LAMISIL) 250 mg tablet; Take 1 tablet (250 mg total) by mouth once daily.  -     Hepatic function panel; Future    Xerosis cutis    Other orders  -     urea (CARMOL) 40 % Crea; Apply topically once daily.      I counseled the patient on her conditions, their implications and medical management.    Onychomycosis: Prescription for Lamisil 250 mg orally x 12 weeks was ordered. Repeat liver function tests in 6 weeks.  We discussed the risks of this medication.  This drug offers a fairly high but not universal cure rate. A 12 week treatment course is recommended. The patient is aware that rare cases of liver injury have been reported; and agrees to come in for liver function tests at 6 weeks of treatment. The symptoms of liver disease have been discussed; call if such occurs. In addition, some insurance plans do not cover the expense of this drug for treating a cosmetic condition, and the patient understands they may have to pay for the medication. Other side effects, such as headaches and rashes, have also been discussed.We also discussed ways to reduce the recurrence of toenail fungus.  Lysol to this shoes.  Avoid cotton socks.  Antifungal sprays to the feet.     A prescription for Neurontin 300 mg tid was written.  Discussed the risks, benefits and adverse effects.  Patient understands that this medication may need to be adjusted and that tolerance may develop.    Shoe inspection. Diabetic Foot Education. Patient reminded of the importance of good nutrition and blood sugar control to  Statement Selected help prevent podiatric complications of diabetes. Patient instructed on proper foot hygeine. We discussed wearing proper shoe gear, daily foot inspections, never walking without protective shoe gear, never putting sharp instruments to feet.  We also discussed padding and shoes with high toe boxes for foot deformities.    - With patient's permission, right 1, 2, 3, 4, 5 and left 1, 2, 3, 4, 5 toenails were aggressively reduced and debrided  to their soft tissue attachment mechanically with nail nipper, removing all offending nail and debris. Patient relates relief following the procedure. Patient will continue to monitor the areas daily, inspect feet, wear protective shoe gear when ambulatory, moisturizer to maintain skin integrity and follow in this office in approximately 3 months, sooner p.jay.        Arturo Trent DPM

## 2020-09-28 NOTE — ED ADULT NURSE REASSESSMENT NOTE - NS ED NURSE REASSESS COMMENT FT1
Pt received from previous RN. Pt assessed. Pt is awake and alert. Pt denies any pain or discomfort. Pt denies dizziness or weakness. As per MD x 5933, pt was awaiting Eccho with contrast results. Pt went this afternoon; discharge pending. Will notify RN when pt is cleared to be discharged by fellow. Cardiac and 02 monitoring maintained. Pt is not in any distress. Safety and comfort maintained. Will continue to monitor.

## 2020-09-28 NOTE — DISCHARGE NOTE PROVIDER - NSDCCPCAREPLAN_GEN_ALL_CORE_FT
PRINCIPAL DISCHARGE DIAGNOSIS  Diagnosis: Syncope  Assessment and Plan of Treatment: You came to the hospital because you passed out at home. Investigation of your heart was normal given your medical history. Orthostatics and an echocardiogram were performed. Please follow-up with your [facility] Doctors at [facility]. Also, please follow-up with your PCP in 1-2 weeks after discharge.

## 2020-09-28 NOTE — DISCHARGE NOTE PROVIDER - CARE PROVIDER_API CALL
Steven Vogel  Cardiology - [place] [facility] Failure team [phone], Dr. Steven Vogel  Phone: (   )    -  Fax: (   )    -  Follow Up Time:

## 2020-09-28 NOTE — PROGRESS NOTE ADULT - ASSESSMENT
65 year old male with hx of cardiac arrest secondary to MI s/p PCI with 2 stents to the LAD in 2019, HFrEF w/ EF 45%, and VT s/p AICD presents with syncope possibly secondary to orthostatic hypotension vs. arrhythmia vs. vasovagal    # Syncope  - likely orthostatic hypotension, however orthostatics from 9/27 are not orthostatic. Repeat today.  - AICD with no events. Follow-up Echo.   - Continue Toprol and amio per cardiology. Aware of LFTs.   - f/u cardio    # CAD s/p MI and PCI to LAD x2  - Continue with Toprol, ASA, and Lipitor     # HFrEF EF 45%  - c/w entresto 49/51 bid and toprol   - received 1L LR in the ED, patient does not appear volume overloaded, no bl le edema and no fluid on chest xray,   - patient states creatinine of 1.9 is known baseline for him    # Vtach s/p AICD  - Amiodarone 200 twice daily  - AICD interrogation was unrevealing   - patient states that [facility] HF team is aware of the liver function and still recommends c/w amiodarone    # Elevated liver enzymes  - mixed pattern, no abdominal pain, HF team aware as per patient. Continue statin.   - RUQ US: Normal, other than GB sludge     # DVT PPX: Heparin sc  # Diet: DASH  # FULL CODE 65 year old male with hx of cardiac arrest secondary to MI s/p PCI with 2 stents to the LAD in 2019, HFrEF w/ EF 45%, and VT s/p AICD presents with syncope possibly secondary to orthostatic hypotension vs. arrhythmia vs. vasovagal    Syncope 2/2 suspected LV thrombus on echo  - likely orthostatic hypotension, however orthostatics from 9/27 are not orthostatic. Repeat today.  - AICD with no events. Follow-up Echo.   - Continue Toprol and amio per cardiology. Aware of LFTs.   - f/u cardiology  - inital echo shows- suspected LV thrombus, will need transthoracic echocardiography with contrast       # CAD s/p MI and PCI to LAD x2  - Continue with Toprol, ASA, and Lipitor     # HFrEF EF 45%  - c/w entresto 49/51 bid and toprol   - received 1L LR in the ED, patient does not appear volume overloaded, no bl le edema and no fluid on chest xray,   - patient states creatinine of 1.9 is known baseline for him    # Vtach s/p AICD  - Amiodarone 200 twice daily  - AICD interrogation was unrevealing   - patient states that [facility] HF team is aware of the liver function and still recommends c/w amiodarone    # Elevated liver enzymes  - mixed pattern, no abdominal pain, HF team aware as per patient. Continue statin.   - RUQ US: Normal, other than GB sludge     # DVT PPX: Heparin sc  # Diet: DASH  # FULL CODE 65 year old male with hx of cardiac arrest secondary to MI s/p PCI with 2 stents to the LAD in 2019, HFrEF w/ EF 45%, and VT s/p AICD presents with syncope possibly secondary to orthostatic hypotension vs. arrhythmia vs. vasovagal    Syncope 2/2 suspected LV thrombus on echo  - likely orthostatic hypotension, however orthostatics from 9/27 are not orthostatic. Repeat today.  - dimer 291, unlikely PE, pt no sob, stable   - AICD with no events. Follow-up Echo.   - Continue Toprol and amio per cardiology. Aware of LFTs.   - f/u cardiology  - inital echo shows- suspected LV thrombus, will need transthoracic echocardiography with contrast       # CAD s/p MI and PCI to LAD x2  - Continue with Toprol, ASA, and Lipitor     # HFrEF EF 45%  - c/w entresto 49/51 bid and toprol   - received 1L LR in the ED, patient does not appear volume overloaded, no bl le edema and no fluid on chest xray,   - patient states creatinine of 1.9 is known baseline for him    # Vtach s/p AICD  - Amiodarone 200 twice daily  - AICD interrogation was unrevealing   - patient states that [facility] HF team is aware of the liver function and still recommends c/w amiodarone    # Elevated liver enzymes  - mixed pattern, no abdominal pain, HF team aware as per patient. Continue statin.   - RUQ US: Normal, other than GB sludge     # DVT PPX: Heparin sc  # Diet: DASH  # FULL CODE

## 2020-09-28 NOTE — ED ADULT NURSE REASSESSMENT NOTE - NS ED NURSE REASSESS COMMENT FT1
Received report from prior RN. Pt on Cardiac/O2 monitor. No s/s of emergent distress noted. Will f/u.

## 2020-09-28 NOTE — DISCHARGE NOTE PROVIDER - NSDCMRMEDTOKEN_GEN_ALL_CORE_FT
amiodarone 200 mg oral tablet: 1 tab(s) orally 2 times a day  aspirin 81 mg oral tablet, chewable: 1 tab(s) orally once a day  atorvastatin 40 mg oral tablet: 1 tab(s) orally once a day  Entresto 49 mg-51 mg oral tablet: 1 tab(s) orally 2 times a day  gabapentin 300 mg oral tablet: 1 dose(s) orally once a day  Toprol-XL 25 mg oral tablet, extended release: 1 tab(s) orally once a day

## 2020-09-28 NOTE — DISCHARGE NOTE PROVIDER - PROVIDER TOKENS
FREE:[LAST:[Lela],FIRST:[Steven],PHONE:[(   )    -],FAX:[(   )    -],ADDRESS:[Cardiology - [place] [facility] Failure team [phone], Dr. Steven Vogel]]

## 2020-09-28 NOTE — PROGRESS NOTE ADULT - SUBJECTIVE AND OBJECTIVE BOX
CRISTIN SIDDIQI 65y Male      Patient is a 65y old  Male who presents with a chief complaint of Syncope (27 Sep 2020 09:31)        INTERVAL HPI/OVERNIGHT EVENTS: No acute events overnight. Patient was seen and evaluated at the bedside. The patient denies pain. Vitals stable. Patient denies fever/chills, chest pain, shortness of breath, abdominal pain, headaches, nausea/vomiting, and diarrhea/constipation.      PHYSICAL EXAM:  GENERAL: NAD  HEAD:  Normocephalic  EYES:  conjunctiva and sclera clear  ENMT: Moist mucous membranes  NECK: Supple  NERVOUS SYSTEM:  Alert, awake  CHEST/LUNG: Good air exchange bilaterally, no wheeze  HEART: Regular rate and rhythm        Vital Signs Last 24 Hrs  T(C): 36.6 (28 Sep 2020 07:27), Max: 37.1 (27 Sep 2020 19:43)  T(F): 97.8 (28 Sep 2020 07:27), Max: 98.7 (27 Sep 2020 19:43)  HR: 56 (28 Sep 2020 07:27) (55 - 66)  BP: 103/58 (28 Sep 2020 07:27) (97/55 - 112/62)  BP(mean): --  RR: 18 (28 Sep 2020 07:27) (17 - 19)  SpO2: 97% (28 Sep 2020 07:27) (97% - 100%)      Consultant(s) Notes Reviewed:  [X] YES  [ ] NO  Care Discussed with Consultants/Other Providers [X] YES  [ ] NO  Imaging Personally Reviewed:  [X] YES  [ ] NO      LABS:                        12.3   6.53  )-----------( 203      ( 28 Sep 2020 05:28 )             38.2     09-28    140  |  107  |  23<H>  ----------------------------<  100<H>  4.4   |  28  |  1.5    Ca    8.0<L>      28 Sep 2020 05:28  Mg     2.0     09-28    TPro  4.7<L>  /  Alb  2.7<L>  /  TBili  0.6  /  DBili  x   /  AST  88<H>  /  ALT  115<H>  /  AlkPhos  113  09-28          CAPILLARY BLOOD GLUCOSE               CRISTIN SIDDIQI 65y Male      Patient is a 65y old  Male who presents with a chief complaint of Syncope (27 Sep 2020 09:31)        INTERVAL HPI/OVERNIGHT EVENTS: No acute events overnight. Patient was seen and evaluated at the bedside. The patient denies pain. Vitals stable. Patient denies fever/chills, chest pain, shortness of breath, abdominal pain, headaches, nausea/vomiting, and diarrhea/constipation.    Attending Note: Pt seen and examined at bedside. No cp or sob. No further syncopal episodes.       PHYSICAL EXAM:  GENERAL: NAD  HEAD:  Normocephalic  EYES:  conjunctiva and sclera clear  ENMT: Moist mucous membranes  NECK: Supple  NERVOUS SYSTEM:  Alert, awake  CHEST/LUNG: Good air exchange bilaterally, no wheeze  HEART: Regular rate and rhythm        Vital Signs Last 24 Hrs  T(C): 36.6 (28 Sep 2020 07:27), Max: 37.1 (27 Sep 2020 19:43)  T(F): 97.8 (28 Sep 2020 07:27), Max: 98.7 (27 Sep 2020 19:43)  HR: 56 (28 Sep 2020 07:27) (55 - 66)  BP: 103/58 (28 Sep 2020 07:27) (97/55 - 112/62)  BP(mean): --  RR: 18 (28 Sep 2020 07:27) (17 - 19)  SpO2: 97% (28 Sep 2020 07:27) (97% - 100%)      Consultant(s) Notes Reviewed:  [X] YES  [ ] NO  Care Discussed with Consultants/Other Providers [X] YES  [ ] NO  Imaging Personally Reviewed:  [X] YES  [ ] NO      LABS:                        12.3   6.53  )-----------( 203      ( 28 Sep 2020 05:28 )             38.2     09-28    140  |  107  |  23<H>  ----------------------------<  100<H>  4.4   |  28  |  1.5    Ca    8.0<L>      28 Sep 2020 05:28  Mg     2.0     09-28    TPro  4.7<L>  /  Alb  2.7<L>  /  TBili  0.6  /  DBili  x   /  AST  88<H>  /  ALT  115<H>  /  AlkPhos  113  09-28          CAPILLARY BLOOD GLUCOSE      < from: TTE Echo Complete w/o Contrast w/ Doppler (09.28.20 @ 09:33) >  Summary:   1. Left ventricular ejection fraction, by visual estimation, is 40 to 45%.   2. Mildly to moderately decreased global left ventricular systolic function.   3. Entire apex, mid and apical anterior septum, and mid inferolateralsegment are abnormal as described above.   4. Spectral Doppler shows impaired relaxation pattern of left ventricular myocardial filling (Grade I diastolic dysfunction).   5. Mild mitral regurgitation.   6. Recommend repeat imaging with Lumason IV contrast to r/o apical LV thrombus.    < end of copied text >           CRISTIN SIDDIQI 65y Male      Patient is a 65y old  Male who presents with a chief complaint of Syncope (27 Sep 2020 09:31)        INTERVAL HPI/OVERNIGHT EVENTS: No acute events overnight. Patient was seen and evaluated at the bedside. The patient denies pain. Vitals stable. Patient denies fever/chills, chest pain, shortness of breath, abdominal pain, headaches, nausea/vomiting, and diarrhea/constipation.    Attending Note: Pt seen and examined at bedside. No cp or sob. No further syncopal episodes.  Pt states that he was at dinner and ate an artichoke and was drinking wine. Felt flushed and then passed out. No shaking witnessed and no incontinence. No sob prior to incident.       PHYSICAL EXAM:  GENERAL: NAD  HEAD:  Normocephalic  EYES:  conjunctiva and sclera clear  ENMT: Moist mucous membranes  NECK: Supple  NERVOUS SYSTEM:  Alert, awake  CHEST/LUNG: Good air exchange bilaterally, no wheeze  HEART: Regular rate and rhythm        Vital Signs Last 24 Hrs  T(C): 36.6 (28 Sep 2020 07:27), Max: 37.1 (27 Sep 2020 19:43)  T(F): 97.8 (28 Sep 2020 07:27), Max: 98.7 (27 Sep 2020 19:43)  HR: 56 (28 Sep 2020 07:27) (55 - 66)  BP: 103/58 (28 Sep 2020 07:27) (97/55 - 112/62)  BP(mean): --  RR: 18 (28 Sep 2020 07:27) (17 - 19)  SpO2: 97% (28 Sep 2020 07:27) (97% - 100%)      Consultant(s) Notes Reviewed:  [X] YES  [ ] NO  Care Discussed with Consultants/Other Providers [X] YES  [ ] NO  Imaging Personally Reviewed:  [X] YES  [ ] NO      LABS:                        12.3   6.53  )-----------( 203      ( 28 Sep 2020 05:28 )             38.2     09-28    140  |  107  |  23<H>  ----------------------------<  100<H>  4.4   |  28  |  1.5    Ca    8.0<L>      28 Sep 2020 05:28  Mg     2.0     09-28    TPro  4.7<L>  /  Alb  2.7<L>  /  TBili  0.6  /  DBili  x   /  AST  88<H>  /  ALT  115<H>  /  AlkPhos  113  09-28          CAPILLARY BLOOD GLUCOSE      < from: TTE Echo Complete w/o Contrast w/ Doppler (09.28.20 @ 09:33) >  Summary:   1. Left ventricular ejection fraction, by visual estimation, is 40 to 45%.   2. Mildly to moderately decreased global left ventricular systolic function.   3. Entire apex, mid and apical anterior septum, and mid inferolateralsegment are abnormal as described above.   4. Spectral Doppler shows impaired relaxation pattern of left ventricular myocardial filling (Grade I diastolic dysfunction).   5. Mild mitral regurgitation.   6. Recommend repeat imaging with Lumason IV contrast to r/o apical LV thrombus.    < end of copied text >

## 2020-09-28 NOTE — PROGRESS NOTE ADULT - ATTENDING COMMENTS
#Progress Note Handoff  Pending (specify):  pending transthoracic echocardiography with contrast   Family discussion: kiko pt and agreed to the plan  Disposition: Home__ x_/SNF___/Other___/Unknown at this time___  Pt seen during COVID 19 Pandemic.

## 2020-09-28 NOTE — DISCHARGE NOTE PROVIDER - HOSPITAL COURSE
65 year old male with hx of cardiac arrest secondary to MI s/p PCI with 2 stents to the LAD in 2019, HFrEF w/ EF 45%, and VT s/p AICD presents with syncope possibly secondary to orthostatic hypotension vs. arrhythmia vs. vasovagal    # Syncope  - likely orthostatic hypotension, however orthostatics from 9/27 are not orthostatic. Repeat today.  - AICD with no events. Echo   - Continue Toprol and amio per cardiology. Aware of LFTs.   - f/u cardio    # CAD s/p MI and PCI to LAD x2  - Continue with Toprol, ASA, and Lipitor     # HFrEF EF 45%  - c/w entresto 49/51 bid and toprol   - received 1L LR in the ED, patient does not appear volume overloaded, no bl le edema and no fluid on chest xray,   - patient states creatinine of 1.9 is known baseline for him    # Vtach s/p AICD  - Amiodarone 200 twice daily  - AICD interrogation was unrevealing   - patient states that [facility] HF team is aware of the liver function and still recommends c/w amiodarone    # Elevated liver enzymes  - mixed pattern, no abdominal pain, HF team aware as per patient. Continue statin.   - RUQ US: Normal, other than GB sludge     # DVT PPX: Heparin sc  # Diet: DASH  # FULL CODE 65 year old male with hx of cardiac arrest secondary to MI s/p PCI with 2 stents to the LAD in 2019, HFrEF w/ EF 45%, and VT s/p AICD presents with syncope possibly secondary to orthostatic hypotension vs. arrhythmia vs. vasovagal    # Syncope  - likely orthostatic hypotension, however orthostatics from 9/27 are not orthostatic. Repeat today.  - AICD with no events. Echo   - Continue Toprol and amio per cardiology. Aware of LFTs.   - f/u cardio    # CAD s/p MI and PCI to LAD x2  - Continue with Toprol, ASA, and Lipitor     # HFrEF EF 45%  - c/w entresto 49/51 bid and toprol   - received 1L LR in the ED, patient does not appear volume overloaded, no bl le edema and no fluid on chest xray,   - patient states creatinine of 1.9 is known baseline for him    # Vtach s/p AICD  - Amiodarone 200 twice daily  - AICD interrogation was unrevealing   - patient states that [facility] HF team is aware of the liver function and still recommends c/w amiodarone    # Elevated liver enzymes  - mixed pattern, no abdominal pain, HF team aware as per patient. Continue statin.   - RUQ US: Normal, other than GB sludge

## 2020-09-28 NOTE — DISCHARGE NOTE NURSING/CASE MANAGEMENT/SOCIAL WORK - PATIENT PORTAL LINK FT
You can access the FollowMyHealth Patient Portal offered by [facility] by registering at the following website: [email or web address]. By joining [facility]’s FollowMyHealth portal, you will also be able to view your health information using other applications (apps) compatible with our system.

## 2020-10-05 DIAGNOSIS — N18.2 CHRONIC KIDNEY DISEASE, STAGE 2 (MILD): ICD-10-CM

## 2020-10-05 DIAGNOSIS — I47.2 VENTRICULAR TACHYCARDIA: ICD-10-CM

## 2020-10-05 DIAGNOSIS — Z95.810 PRESENCE OF AUTOMATIC (IMPLANTABLE) CARDIAC DEFIBRILLATOR: ICD-10-CM

## 2020-10-05 DIAGNOSIS — R55 SYNCOPE AND COLLAPSE: ICD-10-CM

## 2020-10-05 DIAGNOSIS — I50.20 UNSPECIFIED SYSTOLIC (CONGESTIVE) HEART FAILURE: ICD-10-CM

## 2020-10-05 DIAGNOSIS — R74.8 ABNORMAL LEVELS OF OTHER SERUM ENZYMES: ICD-10-CM

## 2020-10-05 DIAGNOSIS — I25.10 ATHEROSCLEROTIC HEART DISEASE OF NATIVE CORONARY ARTERY WITHOUT ANGINA PECTORIS: ICD-10-CM

## 2020-10-05 DIAGNOSIS — Z95.5 PRESENCE OF CORONARY ANGIOPLASTY IMPLANT AND GRAFT: ICD-10-CM

## 2020-10-05 DIAGNOSIS — E83.42 HYPOMAGNESEMIA: ICD-10-CM

## 2020-10-05 DIAGNOSIS — I25.2 OLD MYOCARDIAL INFARCTION: ICD-10-CM

## 2020-11-11 ENCOUNTER — OUTPATIENT (OUTPATIENT)
Dept: OUTPATIENT SERVICES | Facility: HOSPITAL | Age: 65
LOS: 1 days | Discharge: HOME | End: 2020-11-11

## 2020-11-11 ENCOUNTER — APPOINTMENT (OUTPATIENT)
Dept: INTERNAL MEDICINE | Facility: CLINIC | Age: 65
End: 2020-11-11
Payer: COMMERCIAL

## 2020-11-11 VITALS
SYSTOLIC BLOOD PRESSURE: 111 MMHG | WEIGHT: 200 LBS | TEMPERATURE: 96.5 F | HEIGHT: 76 IN | HEART RATE: 52 BPM | DIASTOLIC BLOOD PRESSURE: 71 MMHG | BODY MASS INDEX: 24.36 KG/M2

## 2020-11-11 DIAGNOSIS — Z86.79 PERSONAL HISTORY OF OTHER DISEASES OF THE CIRCULATORY SYSTEM: ICD-10-CM

## 2020-11-11 DIAGNOSIS — Z00.00 ENCOUNTER FOR GENERAL ADULT MEDICAL EXAMINATION WITHOUT ABNORMAL FINDINGS: ICD-10-CM

## 2020-11-11 DIAGNOSIS — Z95.5 PRESENCE OF CORONARY ANGIOPLASTY IMPLANT AND GRAFT: Chronic | ICD-10-CM

## 2020-11-11 DIAGNOSIS — I25.10 ATHEROSCLEROTIC HEART DISEASE OF NATIVE CORONARY ARTERY WITHOUT ANGINA PECTORIS: ICD-10-CM

## 2020-11-11 DIAGNOSIS — I10 ESSENTIAL (PRIMARY) HYPERTENSION: ICD-10-CM

## 2020-11-11 DIAGNOSIS — I24.9 ATHEROSCLEROTIC HEART DISEASE OF NATIVE CORONARY ARTERY W/OUT ANGINA PECTORIS: ICD-10-CM

## 2020-11-11 DIAGNOSIS — F50.9 EATING DISORDER, UNSPECIFIED: ICD-10-CM

## 2020-11-11 DIAGNOSIS — I50.20 UNSPECIFIED SYSTOLIC (CONGESTIVE) HEART FAILURE: ICD-10-CM

## 2020-11-11 DIAGNOSIS — I25.10 ATHEROSCLEROTIC HEART DISEASE OF NATIVE CORONARY ARTERY W/OUT ANGINA PECTORIS: ICD-10-CM

## 2020-11-11 DIAGNOSIS — Z00.00 ENCOUNTER FOR GENERAL ADULT MEDICAL EXAMINATION W/OUT ABNORMAL FINDINGS: ICD-10-CM

## 2020-11-11 DIAGNOSIS — Z98.890 OTHER SPECIFIED POSTPROCEDURAL STATES: Chronic | ICD-10-CM

## 2020-11-11 DIAGNOSIS — Z95.810 PRESENCE OF AUTOMATIC (IMPLANTABLE) CARDIAC DEFIBRILLATOR: Chronic | ICD-10-CM

## 2020-11-11 DIAGNOSIS — S74.12XA: ICD-10-CM

## 2020-11-11 DIAGNOSIS — I50.9 HEART FAILURE, UNSPECIFIED: ICD-10-CM

## 2020-11-11 PROCEDURE — 99202 OFFICE O/P NEW SF 15 MIN: CPT | Mod: GC

## 2020-11-11 RX ORDER — BISOPROLOL FUMARATE 5 MG/1
TABLET, FILM COATED ORAL
Refills: 0 | Status: DISCONTINUED | COMMUNITY
End: 2020-11-11

## 2020-11-11 RX ORDER — CLOPIDOGREL 75 MG/1
75 TABLET, FILM COATED ORAL
Refills: 0 | Status: ACTIVE | COMMUNITY

## 2020-11-11 RX ORDER — ATORVASTATIN CALCIUM 40 MG/1
40 TABLET, FILM COATED ORAL
Refills: 0 | Status: ACTIVE | COMMUNITY

## 2020-11-11 RX ORDER — OMEPRAZOLE, SODIUM BICARBONATE 40; 1100 MG/1; MG/1
40-1100 CAPSULE ORAL
Refills: 0 | Status: DISCONTINUED | COMMUNITY
End: 2020-11-11

## 2020-11-11 RX ORDER — AMIODARONE HYDROCHLORIDE 200 MG/1
200 TABLET ORAL
Refills: 0 | Status: ACTIVE | COMMUNITY

## 2020-11-11 RX ORDER — GABAPENTIN 300 MG/1
300 CAPSULE ORAL
Refills: 0 | Status: ACTIVE | COMMUNITY

## 2020-11-11 RX ORDER — METOPROLOL SUCCINATE 50 MG/1
50 TABLET, EXTENDED RELEASE ORAL
Refills: 0 | Status: ACTIVE | COMMUNITY

## 2020-11-11 RX ORDER — SACUBITRIL AND VALSARTAN 24; 26 MG/1; MG/1
24-26 TABLET, FILM COATED ORAL
Refills: 0 | Status: ACTIVE | COMMUNITY

## 2020-11-11 NOTE — ASSESSMENT
[FreeTextEntry1] : 65-year-old, PMHx of Hypertension( Resolved after MI as per patient..), CAD with a myocardial infarction in Feb 2019 s/p 2 stents back then in the LAD and a 3rd stent in October 2020 in the ramus artery, HFrEF ( no Echo here, but the patient reports that last Echo showed EF: in low 40's). Presented for a establishing care. The patient at the moment denies any chest pains, any shortness of breath, any abdominal distention or lower extremity edema. He follow us with heart failure specialist ( Dr. Vogel) at [facility]. \par \par #CAD s/p MI in Feb 2019 with 2 stents in LAD and a Stend in ramus in October 2020: \par - Continue with Aspirin 81mg po once daily \par - Continue with Plavix 75mg po once daily \par - Continue with Lipitor 40mg po once daily \par - Continue with Metoprolol succinate 50mg po once daily \par \par #HFrEF s/p AICD insertion: \par - Patient reports Echo shows an EF in lower 40's \par - Will ask for records \par - Continue with Entresto 24/26mg po q12hrs \par - Continue with Metoprolol succinate 50mg po once daily \par \par #Hypertension: \par - Continue with metoprolol succinate 50mg po once dialy \par - BP: 111/71mmHg\par \par #Left Femoral nerve inury: \par - Got the  Impella while he had the massive MI in Feb 2019. \par - Continue with Gabapentin 300mg po once daily \par \par #HCM: \par - Flu shot taken at [place] \par - Pneumonia shot taken at [place] 1.5-2 years ago \par - Colon cancer: The patient's heart failure specialist and gastroenterologist prefer to wait for now until his cardiac wise stable before they perform colonoscopy

## 2020-11-11 NOTE — HISTORY OF PRESENT ILLNESS
[Other: _____] : [unfilled] [FreeTextEntry1] : Establish Care  [de-identified] : 65-year-old, PMHx of Hypertension( Resolved after MI as per patient..), CAD with a myocardial infarction in Feb 2019 s/p 2 stents back then in the LAD and a 3rd stent in October 2020 in the ramus artery, HFrEF ( no Echo here, but the patient reports that last Echo showed EF: in low 40's). Presented for a establishing care. The patient at the moment denies any chest pains, any shortness of breath, any abdominal distention or lower extremity edema. He follow us with heart failure specialist ( Dr. Vogel) at [facility]. \par \par

## 2020-11-11 NOTE — PLAN
[FreeTextEntry1] : 1) Order labs including TSH and LFTs ( on lipitor and Amiodarone), HbA1c and lipid panel

## 2020-11-11 NOTE — REVIEW OF SYSTEMS
[Negative] : Neurological [Fever] : no fever [Chills] : no chills [Fatigue] : no fatigue [Hot Flashes] : no hot flashes [Night Sweats] : no night sweats [Recent Change In Weight] : ~T no recent weight change [Discharge] : no discharge [Pain] : no pain [Redness] : no redness [Dryness] : no dryness [Vision Problems] : no vision problems [Itching] : no itching [Chest Pain] : no chest pain [Palpitations] : no palpitations [Claudication] : no  leg claudication [Lower Ext Edema] : no lower extremity edema [Orthopena] : no orthopnea [Paroxysmal Nocturnal Dyspnea] : no paroxysmal nocturnal dyspnea [Wheezing] : no wheezing [Cough] : no cough [Dyspnea on Exertion] : not dyspnea on exertion [Abdominal Pain] : no abdominal pain [Nausea] : no nausea [Constipation] : no constipation [Diarrhea] : no diarrhea [Vomiting] : no vomiting [Heartburn] : no heartburn [Melena] : no melena [FreeTextEntry5] : The patient has only had 2 episodes of lower extremity swelling for which he contacted Dr. Vogel ( HF specialist ar [place] and they instruct him to take lasix after which the edema resolves, currently NO edema).

## 2020-11-11 NOTE — PHYSICAL EXAM
[No Acute Distress] : no acute distress [Well Nourished] : well nourished [Well Developed] : well developed [Normal Sclera/Conjunctiva] : normal sclera/conjunctiva [Normal Outer Ear/Nose] : the outer ears and nose were normal in appearance [No JVD] : no jugular venous distention [No Respiratory Distress] : no respiratory distress  [No Accessory Muscle Use] : no accessory muscle use [Clear to Auscultation] : lungs were clear to auscultation bilaterally [Normal Percussion] : the chest was normal to percussion [Normal Rate] : normal rate  [Regular Rhythm] : with a regular rhythm [Normal S1, S2] : normal S1 and S2 [No Murmur] : no murmur heard [Soft] : abdomen soft [Non Tender] : non-tender [Non-distended] : non-distended [No Masses] : no abdominal mass palpated [No HSM] : no HSM [Normal Bowel Sounds] : normal bowel sounds [No Joint Swelling] : no joint swelling [No Rash] : no rash [No Skin Lesions] : no skin lesions [Coordination Grossly Intact] : coordination grossly intact [No Focal Deficits] : no focal deficits [Normal Gait] : normal gait [Speech Grossly Normal] : speech grossly normal [Memory Grossly Normal] : memory grossly normal [Normal Affect] : the affect was normal [Normal Mood] : the mood was normal [Normal] : affect was normal and insight and judgment were intact [Acne] : no acne

## 2021-02-02 ENCOUNTER — OUTPATIENT (OUTPATIENT)
Dept: OUTPATIENT SERVICES | Facility: HOSPITAL | Age: 66
LOS: 1 days | Discharge: HOME | End: 2021-02-02

## 2021-02-02 ENCOUNTER — OUTPATIENT (OUTPATIENT)
Dept: OUTPATIENT SERVICES | Facility: HOSPITAL | Age: 66
LOS: 1 days | Discharge: HOME | End: 2021-02-02
Payer: COMMERCIAL

## 2021-02-02 DIAGNOSIS — Z95.5 PRESENCE OF CORONARY ANGIOPLASTY IMPLANT AND GRAFT: Chronic | ICD-10-CM

## 2021-02-02 DIAGNOSIS — Z95.810 PRESENCE OF AUTOMATIC (IMPLANTABLE) CARDIAC DEFIBRILLATOR: Chronic | ICD-10-CM

## 2021-02-02 DIAGNOSIS — N63.20 UNSPECIFIED LUMP IN THE LEFT BREAST, UNSPECIFIED QUADRANT: ICD-10-CM

## 2021-02-02 DIAGNOSIS — I50.22 CHRONIC SYSTOLIC (CONGESTIVE) HEART FAILURE: ICD-10-CM

## 2021-02-02 DIAGNOSIS — N64.59 OTHER SIGNS AND SYMPTOMS IN BREAST: ICD-10-CM

## 2021-02-02 DIAGNOSIS — Z98.890 OTHER SPECIFIED POSTPROCEDURAL STATES: Chronic | ICD-10-CM

## 2021-02-02 PROCEDURE — G0279: CPT | Mod: 26

## 2021-02-02 PROCEDURE — 77066 DX MAMMO INCL CAD BI: CPT | Mod: 26

## 2021-02-02 PROCEDURE — 76642 ULTRASOUND BREAST LIMITED: CPT | Mod: 26,50

## 2021-04-30 ENCOUNTER — OUTPATIENT (OUTPATIENT)
Dept: OUTPATIENT SERVICES | Facility: HOSPITAL | Age: 66
LOS: 1 days | Discharge: HOME | End: 2021-04-30
Payer: COMMERCIAL

## 2021-04-30 DIAGNOSIS — Z95.810 PRESENCE OF AUTOMATIC (IMPLANTABLE) CARDIAC DEFIBRILLATOR: Chronic | ICD-10-CM

## 2021-04-30 DIAGNOSIS — Z95.5 PRESENCE OF CORONARY ANGIOPLASTY IMPLANT AND GRAFT: Chronic | ICD-10-CM

## 2021-04-30 DIAGNOSIS — S74.12XS: ICD-10-CM

## 2021-04-30 DIAGNOSIS — Z98.890 OTHER SPECIFIED POSTPROCEDURAL STATES: Chronic | ICD-10-CM

## 2021-04-30 PROCEDURE — 93970 EXTREMITY STUDY: CPT | Mod: 26

## 2021-05-18 ENCOUNTER — NON-APPOINTMENT (OUTPATIENT)
Age: 66
End: 2021-05-18

## 2021-05-25 ENCOUNTER — NON-APPOINTMENT (OUTPATIENT)
Age: 66
End: 2021-05-25

## 2021-05-27 ENCOUNTER — APPOINTMENT (OUTPATIENT)
Dept: PLASTIC SURGERY | Facility: CLINIC | Age: 66
End: 2021-05-27
Payer: COMMERCIAL

## 2021-05-27 VITALS — HEIGHT: 74 IN | BODY MASS INDEX: 26.95 KG/M2 | WEIGHT: 210 LBS

## 2021-05-27 DIAGNOSIS — I25.2 OLD MYOCARDIAL INFARCTION: ICD-10-CM

## 2021-05-27 DIAGNOSIS — C44.321 SQUAMOUS CELL CARCINOMA OF SKIN OF NOSE: ICD-10-CM

## 2021-05-27 PROCEDURE — 99203 OFFICE O/P NEW LOW 30 MIN: CPT

## 2021-05-27 PROCEDURE — 99072 ADDL SUPL MATRL&STAF TM PHE: CPT

## 2021-05-27 RX ORDER — DAPAGLIFLOZIN 10 MG/1
TABLET, FILM COATED ORAL
Refills: 0 | Status: ACTIVE | COMMUNITY

## 2021-05-27 NOTE — PHYSICAL EXAM
[de-identified] : well developed male, NAD [de-identified] : NC/AT [de-identified] : PERRL [de-identified] : supple [de-identified] : unlabored breathing,  [de-identified] : SYDNEER [de-identified] : soft, nontender  [de-identified] : Nose - healing biopsy site right nasal bridge, clean

## 2021-05-27 NOTE — HISTORY OF PRESENT ILLNESS
[FreeTextEntry1] : 66 yo M with PMHx of HTN, HLD, CAD, MI in 2019 s/p cardiac stents on ASA/Plavix and AICD with recently diagnosed right nasal SCC to undergo Moh's procedure with Dr. Aguilar 6/2 and presents today to discuss reconstructive options. No prior personal or family h/o skin cancer. \par \par Occupation -  for [employer]  \par Nonsmoker \par \par Cardiologist-  Dr. Vogel at [facility]   \par

## 2021-05-27 NOTE — ASSESSMENT
[FreeTextEntry1] : 64 yo M with multiple medical issues now with recently diagnosed nasal SCC to undergo Moh's procedure likely requiring soft tissue reconstruction. \par \par - f/u after Moh's for wound assessment to finalize reconstructive plan \par - cardiology clearance \par \par Regarding the reconstruction after Mohs surgery, we discussed scarring, risk of repeat procedure, poor wound healing, need for additional revisionary surgery,asymmetry, dissatisfaction with the outcome, and unplanned surgery in the future.  All questions were answered.  All risks were well understood by the patient.\par \par Regarding the reconstruction after skin cancer  surgery, we discussed scarring, risk of repeat procedure, poor wound healing, need for additional revisionary surgery,asymmetry, dissatisfaction with the outcome, and unplanned surgery in the future.  All questions were answered.  All risks were well understood by the patient.\par \par Due to COVID 19, pre-visit patient instructions were explained to the patient and their symptoms were checked upon arrival.  \par Masks were used by the health care providers and staff and the examination room was cleaned after the patient visit was completed.\par \par all ?s asnwered\par \par f/u in one week after Mohs for possible office closure

## 2021-06-03 ENCOUNTER — APPOINTMENT (OUTPATIENT)
Dept: PLASTIC SURGERY | Facility: CLINIC | Age: 66
End: 2021-06-03

## 2021-06-08 NOTE — ED ADULT NURSE NOTE - ALCOHOL PRE SCREEN (AUDIT - C)
Ambulatory Care Management Note        Date/Time:  6/8/2021 2:25 PM    This patient was received as a referral from  Insurance referral for case management of ED utilization. Multiple unsuccessful attempts have been made to contact patient. Ambulatory Care Management get in touch letter mailed to the patients address on file. No further outreach attempts are scheduled by [facility] at this time. Statement Selected

## 2021-06-23 ENCOUNTER — OUTPATIENT (OUTPATIENT)
Dept: OUTPATIENT SERVICES | Facility: HOSPITAL | Age: 66
LOS: 1 days | Discharge: HOME | End: 2021-06-23

## 2021-06-23 DIAGNOSIS — Z95.5 PRESENCE OF CORONARY ANGIOPLASTY IMPLANT AND GRAFT: Chronic | ICD-10-CM

## 2021-06-23 DIAGNOSIS — I42.9 CARDIOMYOPATHY, UNSPECIFIED: ICD-10-CM

## 2021-06-23 DIAGNOSIS — Z95.810 PRESENCE OF AUTOMATIC (IMPLANTABLE) CARDIAC DEFIBRILLATOR: Chronic | ICD-10-CM

## 2021-06-23 DIAGNOSIS — Z98.890 OTHER SPECIFIED POSTPROCEDURAL STATES: Chronic | ICD-10-CM

## 2021-07-12 ENCOUNTER — TRANSCRIPTION ENCOUNTER (OUTPATIENT)
Age: 66
End: 2021-07-12

## 2021-08-31 ENCOUNTER — TRANSCRIPTION ENCOUNTER (OUTPATIENT)
Age: 66
End: 2021-08-31

## 2021-09-20 ENCOUNTER — TRANSCRIPTION ENCOUNTER (OUTPATIENT)
Age: 66
End: 2021-09-20

## 2022-01-07 ENCOUNTER — TRANSCRIPTION ENCOUNTER (OUTPATIENT)
Age: 67
End: 2022-01-07

## 2022-01-23 ENCOUNTER — TRANSCRIPTION ENCOUNTER (OUTPATIENT)
Age: 67
End: 2022-01-23

## 2022-04-05 NOTE — ED ADULT NURSE NOTE - NSFALLRSKHARMRISK_ED_ALL_ED
Health Maintenance Due   Topic Date Due   • Shingles Vaccine (1 of 2) Never done   • Hepatitis C Screening  Never done   • DTaP/Tdap/Td Vaccine (3 - Td or Tdap) 03/22/2022   • Breast Cancer Screening  03/12/2022       Patient is due for topics as listed above but is not proceeding with Immunization(s) Dtap/Tdap/Td and Shingles and Mammogram at this time. Education provided for Immunization(s) Dtap/Tdap/Td and Shingles, Hepatitis C Screening and Mammogram.    Patient has to pay out of pocket for most things right now due to insurance & is hesitant to complete some things.    Recent PHQ 2/9 Score    PHQ 2:  Date Adult PHQ 2 Score Adult PHQ 2 Interpretation   4/5/2022 0 No further screening needed       PHQ 9:  Date Adult PHQ 9 Score Adult PHQ 9 Interpretation   4/5/2022 2 Minimal Depression       
no

## 2022-07-28 ENCOUNTER — NON-APPOINTMENT (OUTPATIENT)
Age: 67
End: 2022-07-28

## 2022-08-22 NOTE — ED ADULT NURSE NOTE - NS PRO PASSIVE SMOKE EXP
Please get an influenza \"Flu\" vaccine in the fall. If you feel short of breath or chest tightness or cough, you can use your albuterol inhaler 4 times per day As Needed. Call Dr. Enrique Valdez office to coordinate a trial stopping your Ramipril medicine. You may need another medicine for high blood pressure in the mean time. I want you to be off the ramipril for at least 4 weeks to see if it helps your cough. No

## 2022-12-31 ENCOUNTER — NON-APPOINTMENT (OUTPATIENT)
Age: 67
End: 2022-12-31

## 2023-03-06 NOTE — ED PROVIDER NOTE - CARE PROVIDER_API CALL
----- Message from Anita Chisholm MD sent at 3/3/2023  2:23 PM CST -----  Hi,  I saw this patient in complex care today and noticed you had tried to reach out to the family. They were having phone issues but seem to be fixed if you don't mind calling them to schedule their Genetics appointment. Thanks for the assistance!    
Attempted to contact mom to schedule an appt for the pt per Dr. Santo' referral. LM for mom that I booked the next available appoint which was 2/12/24 at 9:30am with Dr. Fraser. Advised mom to give us a call back if needed to cancel or reschedule.   
Teddy Ramos)  Cardiovascular Disease; Interventional Cardiology  [address] [place] [address]  Phone: [phone]  Fax: [phone]  Follow Up Time: 1-3 Days

## 2023-07-24 NOTE — ED ADULT NURSE NOTE - NS ED NURSE LEVEL OF CONSCIOUSNESS ORIENTATION
Patient states family/friend will be with them for 24 hours following procedure.      Permission received to review discharge instructions and discuss private health information with *** and will have someone with them after discharge Oriented - self; Oriented - place; Oriented - time

## 2023-10-08 ENCOUNTER — NON-APPOINTMENT (OUTPATIENT)
Age: 68
End: 2023-10-08

## 2023-10-14 ENCOUNTER — NON-APPOINTMENT (OUTPATIENT)
Age: 68
End: 2023-10-14

## 2024-11-05 NOTE — ED ADULT NURSE NOTE - NS ED NURSE LEVEL OF CONSCIOUSNESS SPEECH
RELAY:    RECEIVED REFERRAL FROM DR PORRAS TO SCHEDULE INITIAL CONSULT WITH SLEEP MED.    PATIENT IS ALREADY ESTABLISHED WITH SLEEP MED.      I CALLED AND LVM FOR PT TO RETURN CALL TO SCHEDULE FOLLOW UP APPT.      CALLED REFERRING OFFICE TO NOTIFY THAT REFERRAL IS CLOSED AND PATIENT HAS BEEN CONTACTED TO SCHEDULE F/U APPT.      PATIENT CAN BE SCHEDULED WITH ANY SLEEP PROVIDER FOR FOLLOW UP.    
Speaking Coherently

## 2025-04-12 NOTE — ED ADULT TRIAGE NOTE - PATIENT ON (OXYGEN DELIVERY METHOD)
"Regarding: heart monitor not lighting up / had cxr wearing it  ----- Message from Sole ODONNELL sent at 4/12/2025 11:10 AM EDT -----  \"My son is wearing a heart monitor and he went and had a chest xray right now and we forgot to tell them he had it on. It's no longer lighting up when we push the button\"    " room air

## 2025-05-04 ENCOUNTER — EMERGENCY (EMERGENCY)
Facility: HOSPITAL | Age: 70
LOS: 0 days | Discharge: LEFT BEFORE DISCHARGE | End: 2025-05-04
Attending: EMERGENCY MEDICINE
Payer: COMMERCIAL

## 2025-05-04 VITALS
RESPIRATION RATE: 20 BRPM | DIASTOLIC BLOOD PRESSURE: 62 MMHG | HEIGHT: 74 IN | SYSTOLIC BLOOD PRESSURE: 96 MMHG | OXYGEN SATURATION: 95 % | WEIGHT: 195.99 LBS | TEMPERATURE: 98 F | HEART RATE: 83 BPM

## 2025-05-04 VITALS
TEMPERATURE: 98 F | OXYGEN SATURATION: 96 % | SYSTOLIC BLOOD PRESSURE: 102 MMHG | DIASTOLIC BLOOD PRESSURE: 67 MMHG | RESPIRATION RATE: 19 BRPM | HEART RATE: 83 BPM

## 2025-05-04 DIAGNOSIS — R50.9 FEVER, UNSPECIFIED: ICD-10-CM

## 2025-05-04 DIAGNOSIS — Z95.5 PRESENCE OF CORONARY ANGIOPLASTY IMPLANT AND GRAFT: Chronic | ICD-10-CM

## 2025-05-04 DIAGNOSIS — I25.2 OLD MYOCARDIAL INFARCTION: ICD-10-CM

## 2025-05-04 DIAGNOSIS — Z85.820 PERSONAL HISTORY OF MALIGNANT MELANOMA OF SKIN: ICD-10-CM

## 2025-05-04 DIAGNOSIS — Z95.810 PRESENCE OF AUTOMATIC (IMPLANTABLE) CARDIAC DEFIBRILLATOR: ICD-10-CM

## 2025-05-04 DIAGNOSIS — I25.10 ATHEROSCLEROTIC HEART DISEASE OF NATIVE CORONARY ARTERY WITHOUT ANGINA PECTORIS: ICD-10-CM

## 2025-05-04 DIAGNOSIS — R60.0 LOCALIZED EDEMA: ICD-10-CM

## 2025-05-04 DIAGNOSIS — R63.8 OTHER SYMPTOMS AND SIGNS CONCERNING FOOD AND FLUID INTAKE: ICD-10-CM

## 2025-05-04 DIAGNOSIS — Z86.74 PERSONAL HISTORY OF SUDDEN CARDIAC ARREST: ICD-10-CM

## 2025-05-04 DIAGNOSIS — Z95.810 PRESENCE OF AUTOMATIC (IMPLANTABLE) CARDIAC DEFIBRILLATOR: Chronic | ICD-10-CM

## 2025-05-04 DIAGNOSIS — N39.0 URINARY TRACT INFECTION, SITE NOT SPECIFIED: ICD-10-CM

## 2025-05-04 DIAGNOSIS — Z85.841 PERSONAL HISTORY OF MALIGNANT NEOPLASM OF BRAIN: ICD-10-CM

## 2025-05-04 DIAGNOSIS — R53.1 WEAKNESS: ICD-10-CM

## 2025-05-04 DIAGNOSIS — Z98.890 OTHER SPECIFIED POSTPROCEDURAL STATES: Chronic | ICD-10-CM

## 2025-05-04 LAB
ALBUMIN SERPL ELPH-MCNC: 2.7 G/DL — LOW (ref 3.5–5.2)
ALP SERPL-CCNC: 120 U/L — HIGH (ref 30–115)
ALT FLD-CCNC: 7 U/L — SIGNIFICANT CHANGE UP (ref 0–41)
ANION GAP SERPL CALC-SCNC: 11 MMOL/L — SIGNIFICANT CHANGE UP (ref 7–14)
APPEARANCE UR: CLEAR — SIGNIFICANT CHANGE UP
AST SERPL-CCNC: 10 U/L — SIGNIFICANT CHANGE UP (ref 0–41)
BASOPHILS # BLD AUTO: 0.01 K/UL — SIGNIFICANT CHANGE UP (ref 0–0.2)
BASOPHILS NFR BLD AUTO: 0.2 % — SIGNIFICANT CHANGE UP (ref 0–1)
BILIRUB SERPL-MCNC: 0.9 MG/DL — SIGNIFICANT CHANGE UP (ref 0.2–1.2)
BILIRUB UR-MCNC: NEGATIVE — SIGNIFICANT CHANGE UP
BUN SERPL-MCNC: 13 MG/DL — SIGNIFICANT CHANGE UP (ref 10–20)
CALCIUM SERPL-MCNC: 8.2 MG/DL — LOW (ref 8.4–10.5)
CHLORIDE SERPL-SCNC: 101 MMOL/L — SIGNIFICANT CHANGE UP (ref 98–110)
CO2 SERPL-SCNC: 23 MMOL/L — SIGNIFICANT CHANGE UP (ref 17–32)
COLOR SPEC: SIGNIFICANT CHANGE UP
CREAT SERPL-MCNC: 1 MG/DL — SIGNIFICANT CHANGE UP (ref 0.7–1.5)
DIFF PNL FLD: NEGATIVE — SIGNIFICANT CHANGE UP
EGFR: 80 ML/MIN/1.73M2 — SIGNIFICANT CHANGE UP
EGFR: 80 ML/MIN/1.73M2 — SIGNIFICANT CHANGE UP
EOSINOPHIL # BLD AUTO: 0.07 K/UL — SIGNIFICANT CHANGE UP (ref 0–0.7)
EOSINOPHIL NFR BLD AUTO: 1.3 % — SIGNIFICANT CHANGE UP (ref 0–8)
GLUCOSE SERPL-MCNC: 99 MG/DL — SIGNIFICANT CHANGE UP (ref 70–99)
GLUCOSE UR QL: >=1000 MG/DL
HCT VFR BLD CALC: 30.9 % — LOW (ref 42–52)
HGB BLD-MCNC: 9.4 G/DL — LOW (ref 14–18)
IMM GRANULOCYTES NFR BLD AUTO: 0.6 % — HIGH (ref 0.1–0.3)
KETONES UR-MCNC: NEGATIVE MG/DL — SIGNIFICANT CHANGE UP
LACTATE SERPL-SCNC: 1.8 MMOL/L — SIGNIFICANT CHANGE UP (ref 0.7–2)
LEUKOCYTE ESTERASE UR-ACNC: NEGATIVE — SIGNIFICANT CHANGE UP
LYMPHOCYTES # BLD AUTO: 2.12 K/UL — SIGNIFICANT CHANGE UP (ref 1.2–3.4)
LYMPHOCYTES # BLD AUTO: 40.8 % — SIGNIFICANT CHANGE UP (ref 20.5–51.1)
MCHC RBC-ENTMCNC: 25.8 PG — LOW (ref 27–31)
MCHC RBC-ENTMCNC: 30.4 G/DL — LOW (ref 32–37)
MCV RBC AUTO: 84.7 FL — SIGNIFICANT CHANGE UP (ref 80–94)
MONOCYTES # BLD AUTO: 0.5 K/UL — SIGNIFICANT CHANGE UP (ref 0.1–0.6)
MONOCYTES NFR BLD AUTO: 9.6 % — HIGH (ref 1.7–9.3)
NEUTROPHILS # BLD AUTO: 2.46 K/UL — SIGNIFICANT CHANGE UP (ref 1.4–6.5)
NEUTROPHILS NFR BLD AUTO: 47.5 % — SIGNIFICANT CHANGE UP (ref 42.2–75.2)
NITRITE UR-MCNC: POSITIVE
NRBC BLD AUTO-RTO: 0 /100 WBCS — SIGNIFICANT CHANGE UP (ref 0–0)
PH UR: 8 — SIGNIFICANT CHANGE UP (ref 5–8)
PLATELET # BLD AUTO: 236 K/UL — SIGNIFICANT CHANGE UP (ref 130–400)
PMV BLD: 9.6 FL — SIGNIFICANT CHANGE UP (ref 7.4–10.4)
POTASSIUM SERPL-MCNC: 4.6 MMOL/L — SIGNIFICANT CHANGE UP (ref 3.5–5)
POTASSIUM SERPL-SCNC: 4.6 MMOL/L — SIGNIFICANT CHANGE UP (ref 3.5–5)
PROT SERPL-MCNC: 5.2 G/DL — LOW (ref 6–8)
PROT UR-MCNC: SIGNIFICANT CHANGE UP MG/DL
RBC # BLD: 3.65 M/UL — LOW (ref 4.7–6.1)
RBC # FLD: 15.2 % — HIGH (ref 11.5–14.5)
SODIUM SERPL-SCNC: 135 MMOL/L — SIGNIFICANT CHANGE UP (ref 135–146)
SP GR SPEC: 1.02 — SIGNIFICANT CHANGE UP (ref 1–1.03)
TROPONIN T, HIGH SENSITIVITY RESULT: 16 NG/L — SIGNIFICANT CHANGE UP (ref 6–21)
UROBILINOGEN FLD QL: 1 MG/DL — SIGNIFICANT CHANGE UP (ref 0.2–1)
WBC # BLD: 5.19 K/UL — SIGNIFICANT CHANGE UP (ref 4.8–10.8)
WBC # FLD AUTO: 5.19 K/UL — SIGNIFICANT CHANGE UP (ref 4.8–10.8)

## 2025-05-04 PROCEDURE — 80053 COMPREHEN METABOLIC PANEL: CPT

## 2025-05-04 PROCEDURE — 71045 X-RAY EXAM CHEST 1 VIEW: CPT

## 2025-05-04 PROCEDURE — 93010 ELECTROCARDIOGRAM REPORT: CPT

## 2025-05-04 PROCEDURE — 81001 URINALYSIS AUTO W/SCOPE: CPT

## 2025-05-04 PROCEDURE — 87040 BLOOD CULTURE FOR BACTERIA: CPT

## 2025-05-04 PROCEDURE — 99285 EMERGENCY DEPT VISIT HI MDM: CPT

## 2025-05-04 PROCEDURE — 71045 X-RAY EXAM CHEST 1 VIEW: CPT | Mod: 26

## 2025-05-04 PROCEDURE — 99285 EMERGENCY DEPT VISIT HI MDM: CPT | Mod: 25

## 2025-05-04 PROCEDURE — 93005 ELECTROCARDIOGRAM TRACING: CPT

## 2025-05-04 PROCEDURE — 84484 ASSAY OF TROPONIN QUANT: CPT

## 2025-05-04 PROCEDURE — 96374 THER/PROPH/DIAG INJ IV PUSH: CPT

## 2025-05-04 PROCEDURE — 85025 COMPLETE CBC W/AUTO DIFF WBC: CPT

## 2025-05-04 PROCEDURE — 96375 TX/PRO/DX INJ NEW DRUG ADDON: CPT

## 2025-05-04 PROCEDURE — 83605 ASSAY OF LACTIC ACID: CPT

## 2025-05-04 PROCEDURE — 36415 COLL VENOUS BLD VENIPUNCTURE: CPT

## 2025-05-04 RX ORDER — CEFPODOXIME PROXETIL 200 MG/1
1 TABLET, FILM COATED ORAL
Qty: 20 | Refills: 0
Start: 2025-05-04 | End: 2025-05-13

## 2025-05-04 RX ORDER — SODIUM CHLORIDE 9 G/1000ML
1000 INJECTION, SOLUTION INTRAVENOUS ONCE
Refills: 0 | Status: COMPLETED | OUTPATIENT
Start: 2025-05-04 | End: 2025-05-04

## 2025-05-04 RX ORDER — VANCOMYCIN HCL IN 5 % DEXTROSE 1.5G/250ML
1000 PLASTIC BAG, INJECTION (ML) INTRAVENOUS ONCE
Refills: 0 | Status: COMPLETED | OUTPATIENT
Start: 2025-05-04 | End: 2025-05-04

## 2025-05-04 RX ORDER — CEFEPIME 2 G/20ML
2000 INJECTION, POWDER, FOR SOLUTION INTRAVENOUS ONCE
Refills: 0 | Status: COMPLETED | OUTPATIENT
Start: 2025-05-04 | End: 2025-05-04

## 2025-05-04 RX ADMIN — Medication 250 MILLIGRAM(S): at 13:22

## 2025-05-04 RX ADMIN — CEFEPIME 100 MILLIGRAM(S): 2 INJECTION, POWDER, FOR SOLUTION INTRAVENOUS at 13:02

## 2025-05-04 RX ADMIN — SODIUM CHLORIDE 1000 MILLILITER(S): 9 INJECTION, SOLUTION INTRAVENOUS at 13:11

## 2025-05-04 RX ADMIN — SODIUM CHLORIDE 1000 MILLILITER(S): 9 INJECTION, SOLUTION INTRAVENOUS at 12:33

## 2025-05-04 NOTE — ED PROVIDER NOTE - OBJECTIVE STATEMENT
Patient is a 69-year-old male, pmhx of cardiac arrest secondary to MI s/p PCI with 2 stents to the LAD in 2019, HFrEF w/ EF 45%, and VT s/p AICD, comes in for weakness, decreased appetite, fever for few days.  Patient states that he was recently treated for melanoma with mets to the brain, chemotherapy last 1 month ago.  Denies any chest pain, shortness of breath, vomiting, abdominal pain, dysuria, hematuria, diarrhea.  Follows up with oncologist at [facility] for cancer.

## 2025-05-04 NOTE — ED ADULT TRIAGE NOTE - CHIEF COMPLAINT QUOTE
Pt being treated for melanoma with mets to the brain, c/o weakness/decreased appetite/fever x several days, took Tylenol at 9am today

## 2025-05-04 NOTE — ED PROVIDER NOTE - PROGRESS NOTE DETAILS
Mekhi Velez, PGY2 Resident:  Patient does not want to be admitted here at [facility]. Want to go home. Has f/u at [facility] there and would rather got there. ED return precautions provided to patient  I had extensive discussion of risks and benefits of pursuing further medical evaluation and/or care with patient and any available family/friends; patient still electing to leave against medical advice. Patient is awake, alert, oriented and demonstrates full capacity and insight into illness. Patient aware and encouraged to return immediately to ED or nearest ED if patient decides to change mind regarding care or if patient experiences any new, worsening, or concerning symptoms.

## 2025-05-04 NOTE — ED PROVIDER NOTE - ATTENDING CONTRIBUTION TO CARE
69-year-old male past medical history of CAD stents AICD melanoma with brain mets last Remicade on August 19, follows at [facility] presents with generalized weakness decreased p.o. for the last few days associated with fever.  Yesterday had a temp of 100.3 today was 101.  No cough runny nose sore throat.  No chest pain shortness of breath.  No rash.  No dysuria frequency hematuria.  No nausea vomit diarrhea abdominal pain.  Patient states he has chronic lower extremity edema.    On exam, AFVSS, Well appearing, No acute distress, NCAT, EOMI, PERRLA, MMM, Neck supple, LCTAB, RRR nl s1s2 No mrg, Abdomen Soft NTND, AAOx3, No Focal Deficits, 2+ pitting bilateral LE edema, no calf TTP,    A/P; fever, generalized weakness, code sepsis called on arrival labs cultures sent IV antibiotics given, found to have UTI, patient refusing admission has capacity to refuse understands risk of death sepsis.  Will have to sign out AMA.  Will send p.o. antibiotics.  Patient understands he could come back anytime if he changes his mind.  Recommend close follow-up with doctors within 1 to 2 days.  Strict return precautions provided.

## 2025-05-04 NOTE — ED PROVIDER NOTE - PHYSICAL EXAMINATION
GENERAL: tired appearing, in no acute distress. AO  SKIN: warm, well perfused  HEAD: Normocephalic; atraumatic.  EYES: no conjunctival erythema, ocular motions intact and appropriate  ENT: airway clear.  CARD: Regular rate and rhythm.   RESP: LCTAB; No wheezes or crackles  ABD: soft and nondistended. nontender  Upper EXT: moving b/l UEs. Rad pulses 2+ symm, sensation intact and symm  Lower EXT: moving b/l LEs, sensation intact and symm. b/l LE swelling symmetrical not calf pain

## 2025-05-04 NOTE — ED PROVIDER NOTE - NSFOLLOWUPINSTRUCTIONS_ED_ALL_ED_FT
Please follow up with your primary care physician and any medical specialist(s) if they were recommended. If you've been prescribed medications, please take your medications as prescribed. Return to the emergency department if your symptoms do not resolve and/or worsen.  ------------------------------------------------------------------------------------------------------------------------  Urinary Tract Infection    A urinary tract infection (UTI) is an infection of any part of the urinary tract, which includes the kidneys, ureters, bladder, and urethra. Risk factors include ignoring your need to urinate, wiping back to front if female, being an uncircumcised male, and having diabetes or a weak immune system. Symptoms include frequent urination, pain or burning with urination, foul smelling urine, cloudy urine, pain in the lower abdomen, blood in the urine, and fever. If you were prescribed an antibiotic medicine, take it as told by your health care provider. Do not stop taking the antibiotic even if you start to feel better.    SEEK IMMEDIATE MEDICAL CARE IF YOU HAVE ANY OF THE FOLLOWING SYMPTOMS: severe back or abdominal pain, fever, inability to keep fluids or medicine down, dizziness/lightheadedness, or a change in mental status.

## 2025-05-04 NOTE — ED PROVIDER NOTE - CLINICAL SUMMARY MEDICAL DECISION MAKING FREE TEXT BOX
69-year-old male past medical history of CAD stents AICD melanoma with brain mets last Remicade on August 19, follows at [facility] presents with generalized weakness decreased p.o. for the last few days associated with fever.  Yesterday had a temp of 100.3 today was 101.  No cough runny nose sore throat.  No chest pain shortness of breath.  No rash.  No dysuria frequency hematuria.  No nausea vomit diarrhea abdominal pain.  Patient states he has chronic lower extremity edema.    On exam, AFVSS, Well appearing, No acute distress, NCAT, EOMI, PERRLA, MMM, Neck supple, LCTAB, RRR nl s1s2 No mrg, Abdomen Soft NTND, AAOx3, No Focal Deficits, 2+ pitting bilateral LE edema, no calf TTP,    A/P; fever, generalized weakness, code sepsis called on arrival labs cultures sent IV antibiotics given, found to have UTI, patient refusing admission has capacity to refuse understands risk of death sepsis.  Will have to sign out AMA.  Will send p.o. antibiotics.  Patient understands he could come back anytime if he changes his mind.  Recommend close follow-up with doctors within 1 to 2 days.  Strict return precautions provided.    Patient is awake/alert/interactive with normal mental status and normal neurologic function. Patient reports no SI/HI and demonstrates normal thought processes with no evidence of intoxication, delirium, delusions or hallucinations. Patient requesting to leave against medical advice at this time. Advised patient of potential risks of leaving AMA which include potential disability or death. Attempted to convince patient to stay and continue work up/treatment and patient refused. Patient has capacity to make medical decisions at this time and will be signed out AMA. Patient instructed to follow up with his primary care provider as an outpatient and is aware they can return to the emergency department at any time for evaluation.

## 2025-05-04 NOTE — ED PROVIDER NOTE - PATIENT PORTAL LINK FT
You can access the FollowMyHealth Patient Portal offered by [facility] by registering at the following website: [email or web address]. By joining [facility]’s FollowMyHealth portal, you will also be able to view your health information using other applications (apps) compatible with our system.

## 2025-09-06 ENCOUNTER — NON-APPOINTMENT (OUTPATIENT)
Age: 70
End: 2025-09-06